# Patient Record
Sex: FEMALE | Race: WHITE | NOT HISPANIC OR LATINO | Employment: OTHER | URBAN - METROPOLITAN AREA
[De-identification: names, ages, dates, MRNs, and addresses within clinical notes are randomized per-mention and may not be internally consistent; named-entity substitution may affect disease eponyms.]

---

## 2019-02-22 ENCOUNTER — NURSE TRIAGE (OUTPATIENT)
Dept: NURSING | Facility: CLINIC | Age: 64
End: 2019-02-22

## 2019-02-22 ENCOUNTER — HOSPITAL ENCOUNTER (EMERGENCY)
Facility: CLINIC | Age: 64
Discharge: HOME | End: 2019-02-23
Attending: PHYSICIAN ASSISTANT | Admitting: PHYSICIAN ASSISTANT
Payer: COMMERCIAL

## 2019-02-22 VITALS
OXYGEN SATURATION: 99 % | DIASTOLIC BLOOD PRESSURE: 109 MMHG | SYSTOLIC BLOOD PRESSURE: 174 MMHG | TEMPERATURE: 97.2 F | HEART RATE: 77 BPM | RESPIRATION RATE: 18 BRPM | WEIGHT: 200 LBS

## 2019-02-22 DIAGNOSIS — B02.9 HERPES ZOSTER WITHOUT COMPLICATION: ICD-10-CM

## 2019-02-22 PROCEDURE — 99282 EMERGENCY DEPT VISIT SF MDM: CPT

## 2019-02-22 SDOH — HEALTH STABILITY: MENTAL HEALTH: HOW OFTEN DO YOU HAVE A DRINK CONTAINING ALCOHOL?: NEVER

## 2019-02-22 NOTE — ED AVS SNAPSHOT
[facility] Emergency Department  [address]  [facility] [address]  Phone:  [phone]  Fax:  [phone]                                    Clare Wallace   MRN: [id number]    Department:  [facility] Emergency Department   Date of Visit:  2/22/2019           After Visit Summary Signature Page    I have received my discharge instructions, and my questions have been answered. I have discussed any challenges I see with this plan with the nurse or doctor.    ..........................................................................................................................................  Patient/Patient Representative Signature      ..........................................................................................................................................  Patient Representative Print Name and Relationship to Patient    ..................................................               ................................................  Date                                   Time    ..........................................................................................................................................  Reviewed by Signature/Title    ...................................................              ..............................................  Date                                               Time          22EPIC Rev 08/18

## 2019-02-23 RX ORDER — VALACYCLOVIR HYDROCHLORIDE 1 G/1
1000 TABLET, FILM COATED ORAL 3 TIMES DAILY
Qty: 21 TABLET | Refills: 0 | Status: SHIPPED | OUTPATIENT
Start: 2019-02-23 | End: 2020-08-12 | Stop reason: DRUGHIGH

## 2019-02-23 ASSESSMENT — ENCOUNTER SYMPTOMS
FEVER: 0
NUMBNESS: 1

## 2019-02-23 NOTE — ED PROVIDER NOTES
Emergency Department Attending Supervision Note  2/23/2019  1:06 AM      I evaluated this patient in conjunction with Natalie Winslow PA-C.      Briefly, the patient presented with a facial rash and numbness. The patient reported recently having a tooth extracted from the right side of her mouth 5 days ago and now has been developing a right sided facial rash and numbness since.     Exam:  General: Patient is alert and interactive when I enter the room  Head:  The scalp, face, and head appear normal  Eyes:  Conjunctivae are normal  ENT:    The nose is normal    Pinnae are normal    External acoustic canals are normal, TMs clear with no vesicular lesions  Neck:  Trachea midline  CV:  Pulses are normal  Resp:  No respiratory distress   Abdomen:      Soft, non-tender, non-distended  Musc:  Normal muscular tone    No major joint effusions    No asymmetric leg swelling    Good capillary refill noted  Skin:  Diffuse erythematous rash on right lateral neck that stops just short of midline in the posterior and aspect, scattered vesicles, no rash on face  Neuro:  Speech is normal and fluent. Face is symmetric.     Moving all extremities well.  Cranial nerves intact.  Strength intact in lower extremities.  Decreased sensation on the right half of face.  Psych: Awake. Alert.  Normal affect.  Appropriate interactions.    MDM:  Clare Wallace is a 63-year-old female who presents with rash and facial numbness.  The rash seems consistent with shingles however it does not completely explain the right-sided facial numbness.  It is on the same side of her rash.  At this point I think she appears quite well and no signs of Ck Hunt syndrome.  I think treatment for her shingles would be the first place to start and then see if her symptoms of numbness improve with this.  Patient was comfortable this plan patient discharged.    Diagnosis    ICD-10-CM    1. Herpes zoster without complication B02.9          Merry CALLOWAY, am  serving as a scribe at 1:06 AM on 2/23/2019 to document services personally performed by Amy Esteban MD based on my observations and the provider's statements to me.        Amy Esteban MD  02/26/19 0532

## 2019-02-23 NOTE — ED PROVIDER NOTES
History     Chief Complaint:  Rash and Numbness    HPI   Clare Wallace is a 63 year old female who presents to the emergency department today for evaluation of a neck dennis and numbness. The patient recently had a tooth extracted on the right. Now, for the past 5 days, she has been developing a rash to her right neck as well. Tonight, the entire right side of her face went numb, prompting her visit. Denies facial droop or weakness. She is not on antibiotics, however has been using topical hydrocortisone on her rash which has not helped at all. She is not in pain other than at the site of her tooth extraction.     Allergies:  No Known Drug Allergies    Medications:    Medications reviewed. No pertinent medications.    Past Medical History:    History reviewed. No pertinent past medical history.    Past Surgical History:    Tooth extraction    Family History:    History reviewed. No pertinent family history.    Social History:  The patient was accompanied to the ED by her .  Smoking Status: Never Smoker  Smokeless Tobacco: Never Used  Alcohol Use: Negative    Marital Status:      Review of Systems   Constitutional: Negative for fever.   HENT: Positive for dental problem.    Skin: Positive for rash.   Neurological: Positive for numbness.   All other systems reviewed and are negative.    Physical Exam     Patient Vitals for the past 24 hrs:   BP Temp Temp src Pulse Resp SpO2 Weight   02/22/19 2354 (!) 174/109 97.2  F (36.2  C) Temporal 77 18 99 % 90.7 kg (200 lb)      Physical Exam  Constitutional: non-toxic appearing, no acute distress.   Head: No external signs of trauma noted to head or face.   Eyes: Pupils are equal, round, and reactive to light. Conjunctiva normal. EOMI.   ENT: MMM. Oropharynx clear and moist. No intraoral swelling or abscess noted. Normal voice.   Neck: erythematous patch-like rash on right side of neck with some small vesicular appearing lesions noted at hairline. Rash does not  cross the midline anteriorly or posteriorly. No lymphadenopathy. Normal ROM. Non-tender.   Cardiovascular: Normal rate, regular rhythm, and intact distal pulses.    Respiratory: Effort normal. No respiratory distress. Lungs clear to auscultation bilaterally.   GI: Soft. There is no tenderness. There is no rebound.   Musculoskeletal: No deformities appreciated. Normal ROM. No edema noted.  Neurological: Alert and Oriented x 3. Speech normal. Moves all extremities equally. CN II-XII intact aside from slight diminished sensation in right face. Coordination normal. 5/5 strength of bilateral upper and lower extremities. Gait normal.   Psychiatric: Appropriate mood, affect, and behavior.   Skin: Skin is warm and dry. Neck rash as noted above.         Emergency Department Course     Emergency Department Course:    0000 Nursing notes and vitals reviewed.    0005 I performed an exam of the patient as documented above.     0111 I personally reviewed the results with the patient and answered all related questions prior to discharge.    Impression & Plan      Medical Decision Making:  Clare Wallace is a 63 year old female who presents to the emergency department today for evaluation of neck rash and facial numbness. She has no focal neurologic deficits on exam. While she reports numbness to her right face, she does have some sensation, just reports it is decreased compared to the left. There is no facial drooping. The rash on her neck is somewhat atypical for herpes zoster, though it does not cross the midline at all so it likely is zoster and she does have a few vesicular lesions noted on the back of her neck near her hairline. The rash is not consistent with cellulitis or a dermatitis at this time. Will plan to treat for herpes zoster with valacyclovir. Given no focal neurologic deficits on exam, there is no indication for any imaging at this time. Patient will be discharged home and will follow-up with her PCP in 1 week.  Instructed to return to the ED for any new or worsening symptoms, including spreading rash, facial droop, other neurologic signs/symptoms, etc.     Diagnosis:    ICD-10-CM    1. Herpes zoster without complication B02.9      Disposition:   Discharge    Discharge Medications:  New Prescriptions    VALACYCLOVIR (VALTREX) 1000 MG TABLET    Take 1 tablet (1,000 mg) by mouth 3 times daily for 7 days     Scribe Disclosure:  Luigi CALLOWAY, am serving as a scribe at 12:05 AM on 2/23/2019 to document services personally performed by Natalie Winslow PA-C based on my observations and the provider's statements to me.    [facility] EMERGENCY DEPARTMENT       Natalie Winslow PA-C  02/23/19 1868

## 2019-02-23 NOTE — ED NOTES
Pt provided with discharge paperwork and educated on recommended follow-up with PCP. Pt educated on how to manage symptoms at home and how to take prescription medications at home. Pt voiced understanding and denied any questions at discharge.

## 2019-02-23 NOTE — TELEPHONE ENCOUNTER
Clare calling with concerns that she had a tooth extraction on 2/15/19. She states that she has a red like rash that is hot to the touch on the right side of her face, the same side as her tooth extraction. And now since that time she has spreading numbness on that same side. She states that she can not get a hold of her Dentist.     Disposition: Advised to be seen in the ED. Have  drive. Clare verbalized understanding and states that she will come in to [facility].   RN advised to call back with any changes, worsening of symptoms, and questions or concerns.   Mayra Mcdaniels RN/FNA      Reason for Disposition    Patient sounds very sick or weak to the triager    Additional Information    Negative: Sounds like a life-threatening emergency to the triager    Negative: Tooth knocked out    Negative: [1] Bleeding present > 30 minutes AND [2] using correct technique of direct pressure    Negative: [1] Bleeding now AND [2] second call after being instructed in correct technique of direct pressure    Negative: [1] Has packing sutured over socket (extraction site) AND [2] now bleeding around the packing (Exception: few drops or ooze)    Protocols used: TOOTH EXTRACTION-ADULT-

## 2019-02-23 NOTE — ED TRIAGE NOTES
63-year-old female presents to the ER with complaints of rash to the right side of her neck that started about 5 days ago which has gotten bigger. States suddenly tonight about 2300 she started with right sided facial numbness as well.

## 2019-09-23 ENCOUNTER — TRANSFERRED RECORDS (OUTPATIENT)
Dept: HEALTH INFORMATION MANAGEMENT | Facility: CLINIC | Age: 64
End: 2019-09-23

## 2019-09-23 LAB — PAP-ABSTRACT: NORMAL

## 2019-10-19 ENCOUNTER — APPOINTMENT (OUTPATIENT)
Dept: CT IMAGING | Facility: CLINIC | Age: 64
End: 2019-10-19
Attending: EMERGENCY MEDICINE
Payer: COMMERCIAL

## 2019-10-19 ENCOUNTER — HOSPITAL ENCOUNTER (EMERGENCY)
Facility: CLINIC | Age: 64
Discharge: HOME | End: 2019-10-19
Attending: EMERGENCY MEDICINE | Admitting: EMERGENCY MEDICINE
Payer: COMMERCIAL

## 2019-10-19 VITALS
DIASTOLIC BLOOD PRESSURE: 84 MMHG | RESPIRATION RATE: 20 BRPM | BODY MASS INDEX: 32.42 KG/M2 | TEMPERATURE: 97.4 F | SYSTOLIC BLOOD PRESSURE: 144 MMHG | OXYGEN SATURATION: 99 % | WEIGHT: 206.57 LBS | HEIGHT: 67 IN

## 2019-10-19 DIAGNOSIS — S09.90XA HEAD INJURY, INITIAL ENCOUNTER: ICD-10-CM

## 2019-10-19 DIAGNOSIS — S00.03XA HEMATOMA OF SCALP, INITIAL ENCOUNTER: ICD-10-CM

## 2019-10-19 PROCEDURE — 99284 EMERGENCY DEPT VISIT MOD MDM: CPT | Mod: 25

## 2019-10-19 PROCEDURE — 70450 CT HEAD/BRAIN W/O DYE: CPT

## 2019-10-19 ASSESSMENT — ENCOUNTER SYMPTOMS
HEADACHES: 1
NAUSEA: 0

## 2019-10-19 ASSESSMENT — MIFFLIN-ST. JEOR: SCORE: 1534.63

## 2019-10-19 NOTE — ED AVS SNAPSHOT
[facility] Emergency Department  [address]  [facility] [address]  Phone:  [phone]  Fax:  [phone]                                    Clare Wallace   MRN: [id number]    Department:  [facility] Emergency Department   Date of Visit:  10/19/2019           After Visit Summary Signature Page    I have received my discharge instructions, and my questions have been answered. I have discussed any challenges I see with this plan with the nurse or doctor.    ..........................................................................................................................................  Patient/Patient Representative Signature      ..........................................................................................................................................  Patient Representative Print Name and Relationship to Patient    ..................................................               ................................................  Date                                   Time    ..........................................................................................................................................  Reviewed by Signature/Title    ...................................................              ..............................................  Date                                               Time          22EPIC Rev 08/18

## 2019-10-20 NOTE — ED PROVIDER NOTES
"  History     Chief Complaint:  Fall    HPI   Clare Wallace is a 63 year old female who presents with head injury.  Patient fell backwards and hit the left side of her scalp on a wood door.  She did not have LOC.  She has a very mild headache.  She denies any nausea or vomiting.  She is not on blood thinners.  She denies any confusion.    Allergies:  No known drug allergies    Medications:    The patient is not currently taking any prescribed medications.    Past Medical History:    The patient does not have any past pertinent medical history.    Past Surgical History:    History reviewed. No pertinent surgical history.    Family History:    History reviewed. No pertinent family history.     Social History:  Smoking status: Never smoker    Alcohol use: No  Marital Status:   [2]     Review of Systems   Gastrointestinal: Negative for nausea.   Neurological: Positive for headaches.   All other systems reviewed and are negative.    Physical Exam     Patient Vitals for the past 24 hrs:   BP Temp Temp src Heart Rate Resp SpO2 Height Weight   10/19/19 1916 (!) 144/84 97.4  F (36.3  C) Oral 68 20 99 % 1.702 m (5' 7\") 93.7 kg (206 lb 9.1 oz)     Physical Exam  General: Patient is alert and interactive when I enter the room  Head:  The scalp, face, and head appear normal  Eyes:  Conjunctivae are normal  ENT:    The nose is normal    Pinnae are normal    External acoustic canals are normal  Neck:  Trachea midline  CV:  Pulses are normal    Resp:  No respiratory distress   Abdomen:      Soft, non-tender, non-distended  Musc:  Normal muscular tone    No major joint effusions    No asymmetric leg swelling  Skin:  No rash or lesions noted  Neuro:  Speech is normal and fluent. Face is symmetric.     Moving all extremities well.   Psych: Awake. Alert.  Normal affect.  Appropriate interactions.    Emergency Department Course     Imaging:  Radiographic findings were communicated with the patient who voiced understanding of the " "findings.    CT Head w/o Contrast   Final Result   IMPRESSION:   1.  Small left parietal scalp hematoma. No fracture or acute hemorrhage.        Emergency Department Course:  Past medical records, nursing notes, and vitals reviewed.  I performed an exam of the patient and obtained history, as documented above.    The patient was sent for a head CT while in the emergency department, findings above.    Findings and plan explained to the Patient. Patient discharged home with instructions regarding supportive care, medications, and reasons to return. The importance of close follow-up was reviewed.     Impression & Plan      Medical Decision Making:  Clare Wallace is a 63 year old female who presents with a head injury. Based on this patient's initial presentation, I was concerned about possible intracranial injuries (e.g. skull fracture, epidural hematoma, subdural hematoma, intracerebral hemorrhage, and traumatic subarachnoid hemorrhage).  CT imaging was obtained and fortunately was normal.  The patient understand that she must return if any \"red flags\" appear/develop in the coming hours/days, as this may represent an indication to perform a repeat CT scan or further evaluation.  I have noted that \"red flags\" include: headaches that get worse, increased drowsiness, strange behavior, repetitive speech, seizures, repeated vomiting, growing confusion, increased irritability, slurred speech, weakness or numbness, and loss of responsiveness.  This information will also be provided in writing at discharge.  The patient's questions have been answered.     Diagnosis:    ICD-10-CM   1. Hematoma of scalp, initial encounter S00.03XA   2. Head injury, initial encounter S09.90XA     Disposition:  discharged to home    10/19/2019   [facility] EMERGENCY DEPARTMENT       Amy Esteban MD  10/19/19 2234    "

## 2019-10-20 NOTE — ED TRIAGE NOTES
Pt slipped on waxed wood floor 1 hour PTA and hit posterior L side of head on corner wood door frame. Denies blood thinners, nausea, or LOC.  ABC in tact. A/oX4

## 2020-03-11 ENCOUNTER — HEALTH MAINTENANCE LETTER (OUTPATIENT)
Age: 65
End: 2020-03-11

## 2020-08-12 ENCOUNTER — OFFICE VISIT (OUTPATIENT)
Dept: PODIATRY | Facility: CLINIC | Age: 65
End: 2020-08-12
Payer: COMMERCIAL

## 2020-08-12 VITALS
HEIGHT: 67 IN | SYSTOLIC BLOOD PRESSURE: 144 MMHG | BODY MASS INDEX: 34.53 KG/M2 | WEIGHT: 220 LBS | HEART RATE: 70 BPM | DIASTOLIC BLOOD PRESSURE: 68 MMHG

## 2020-08-12 DIAGNOSIS — B35.1 ONYCHOMYCOSIS: ICD-10-CM

## 2020-08-12 DIAGNOSIS — M72.2 PLANTAR FASCIITIS: Primary | ICD-10-CM

## 2020-08-12 DIAGNOSIS — B35.9 TINEA: ICD-10-CM

## 2020-08-12 PROBLEM — H52.4 PRESBYOPIA: Status: ACTIVE | Noted: 2018-12-18

## 2020-08-12 PROBLEM — H52.222 REGULAR ASTIGMATISM OF LEFT EYE: Status: ACTIVE | Noted: 2018-12-18

## 2020-08-12 PROBLEM — D31.31 CHOROIDAL NEVUS OF RIGHT EYE: Status: ACTIVE | Noted: 2018-12-18

## 2020-08-12 PROBLEM — H52.11 MYOPIA OF RIGHT EYE: Status: ACTIVE | Noted: 2018-12-18

## 2020-08-12 PROCEDURE — 99203 OFFICE O/P NEW LOW 30 MIN: CPT | Performed by: PODIATRIST

## 2020-08-12 RX ORDER — TERBINAFINE HYDROCHLORIDE 250 MG/1
250 TABLET ORAL DAILY
Qty: 30 TABLET | Refills: 0 | Status: SHIPPED | OUTPATIENT
Start: 2020-08-12 | End: 2020-10-11

## 2020-08-12 RX ORDER — LEVOTHYROXINE SODIUM 125 UG/1
125 TABLET ORAL
COMMUNITY
Start: 2020-05-26 | End: 2021-03-30

## 2020-08-12 RX ORDER — HYDROCHLOROTHIAZIDE 12.5 MG/1
12.5 TABLET ORAL
COMMUNITY
Start: 2020-07-08 | End: 2021-03-30

## 2020-08-12 RX ORDER — ERGOCALCIFEROL 1.25 MG/1
50000 CAPSULE, LIQUID FILLED ORAL
COMMUNITY
Start: 2020-06-08 | End: 2021-03-30

## 2020-08-12 RX ORDER — VALACYCLOVIR HYDROCHLORIDE 1 G/1
1 TABLET, FILM COATED ORAL
COMMUNITY
Start: 2020-02-18 | End: 2021-11-22

## 2020-08-12 ASSESSMENT — MIFFLIN-ST. JEOR: SCORE: 1590.54

## 2020-08-12 NOTE — PROGRESS NOTES
"Subjective:    Patient is seen today as a new pt self referral with a 2 month(s) hx of right heel pain.  Points to the plantarmedial calcaneal tubercle.  Most painful upon rising in a.m. or after prolonged sitting.  Aggravated by activity and relieved by rest.  Has tried changing shoewear, OTC inserts, without much success.  Denies erythema, edema, ecchymosis, numbness, loss of strength.  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  Mostly sitting during the day.  She also has dry skin on her right foot.  She said this for many years.  She is tried numerous topical antifungals.  She also has a thick right hallux nail.  This is not painful at all.    ROS:  A 10-point review of systems was performed and is positive for that noted in the HPI and as seen below.  All other areas are negative.      No Known Allergies    Current Outpatient Medications   Medication Sig Dispense Refill     hydrochlorothiazide (HYDRODIURIL) 12.5 MG tablet 12.5 mg       levothyroxine (SYNTHROID/LEVOTHROID) 125 MCG tablet 125 mcg       terbinafine (LAMISIL) 250 MG tablet Take 1 tablet (250 mg) by mouth daily 30 tablet 0     valACYclovir (VALTREX) 1000 mg tablet Take 1 g by mouth       vitamin D2 (ERGOCALCIFEROL) 04045 units (1250 mcg) capsule Take 50,000 Units by mouth         Patient Active Problem List   Diagnosis     Choroidal nevus of right eye     Genital herpes     Headache     Heart murmur     Hypothyroidism     Menopausal state     Migraine     Myopia of right eye     Presbyopia     Regular astigmatism of left eye     Tinnitus       No past medical history on file.    No past surgical history on file.    No family history on file.    Social History     Tobacco Use     Smoking status: Never Smoker     Smokeless tobacco: Never Used   Substance Use Topics     Alcohol use: No     Frequency: Never         Objective:    Vitals: BP (!) 144/68   Pulse 70   Ht 1.702 m (5' 7\")   Wt 99.8 kg (220 lb)   BMI 34.46 kg/m    BMI: Body mass index is 34.46 " "kg/m .  Height: 5' 7\"    Constitutional/ general:  Pt is in no apparent distress, appears well-nourished.  Cooperative with history and physical exam.     Psych:  The patient answered questions appropriately.  Normal affect.  Seems to have reasonable expectations, in terms of treatment.     Eyes:  Visual scanning/ tracking without deficit.     Ears:  Response to auditory stimuli is normal.  No hearing aid devices.  Auricles in proper alignment.     Lymphatic:  Popliteal lymph nodes not enlarged.     Lungs:  Non labored breathing, non labored speech. No cough.  No audible wheezing. Even, quiet breathing.       Vascular:  Pedal pulses are palpable bilaterally for both the DP and PT arteries.  CFT < 3 sec.  No edema.  Pedal hair growth noted.     Neuro:  Alert and oriented x 3. Coordinated gait.  Light touch sensation is intact to the L4, L5, S1 distributions. No obvious deficits.  No evidence of neurological-based weakness, spasticity, or contracture in the lower extremities.     Derm: Normal texture and turgor.  No erythema, ecchymosis, or cyanosis.  No open lesions.  Right foot skin dry moccasin distribution.  Right hallux nail mycotic.    Musculoskeletal:    Lower extremity muscle strength is normal.  Patient is ambulatory without an assistive device or brace.  No gross deformities.      Normal arch with weightbearing.   ROM is within normal limits.  Negative tinel's sign.  No side to side compression pain of the calcaneus.  Pain upon palpation to the right plantarmedial  of the calcaneus.  No erythema, edema, ecchymosis, or subcutaneous masses noted.  No pain on palpation or stressing any tendons.  Negative Tinel's sign          Assessment:    Plantar Fasciitis right foot   right chronic tinea  Right hallux onychomycosis      Plan:   Discussed cause of fungal infection in both skin and nail.  She would rather just watch the nail.  She is more concerned about her dry skin.  She has failed topical antifungals.  We " discussed going on and Lamisil orally for 1 month.  She is in agreement with this.  Wrote a prescription.  We discussed strategies on keeping her dry foot in the future.      Discussed etiology and treatment options with the patient.  The potential causes and nature of plantar fasciitis were discussed with the patient.  We reviewed the natural history/prognosis of the condition and risks if left untreated.  These include chronic pain, other sites of pain due to gait changes, and potential plantar fascial rupture.      We discussed possible causes of the condition as it relates to the patients specific situation.      Conservative treatment options were reviewed:  appropriate shoes, avoidance of barefoot walking, inserts/orthoses, stretching, ice, massage, immobilization and NSAIDs.     We also reviewed the options of injection therapy and surgery.  However, it was made clear that surgery is only considered when conservative therapy fails.  The risks and benefits of injection therapy, and surgery were discussed.  Dispensed handout.       After thorough discussion and answering all questions, the patient elected to modifying activities, supportive shoes, ice, stretching, and not going barefoot.  Good house shoes at all times and I made recommendations.  Avoid activities of bother this and we discussed what will bother this.  RTC in 4 weeks if not better otherwise prn.     Heriberto Dickerson, MARIYA DPTONYA, FACFAS

## 2020-08-12 NOTE — PATIENT INSTRUCTIONS
We wish you continued good healing. If you have any questions or concerns, please do not hesitate to contact us at [phone]    Please remember to call and schedule a follow up appointment if one was recommended at your earliest convenience.   PODIATRY CLINIC HOURS  TELEPHONE NUMBER    Dr. Heriberto Dickerson D.P.M [facility]    Clinics:  [facility]    [facility]ushka [facility]   Tuesday 1PM-6PM  [place]  Wednesday 7AM-2PM  [facility]  Thursday 10AM-6PM  [place]  Friday 7AM-3PM  [place]  Specialty schedulers:   [phone] to make an appointment with any Specialty Provider.        Urgent Care locations:    [facility] Monday-Friday 5 pm - 9 pm. Saturday-Sunday 9 am -5pm    Monday-Friday 11 am - 9 pm Saturday 9 am - 5 pm     Monday-Sunday 12 noon-8PM [phone]     If you need a medication refill, please contact us you may need lab work and/or a follow up visit prior to your refill (i.e. Antifungal medications).    [employer] (secure e-mail communication and access to your chart) to send a message or to make an appointment.    If MRI needed please call [facility] Uribe at [phone]

## 2020-08-12 NOTE — LETTER
8/12/2020         RE: Clare Wallace  [address]  [facility] [address]        Dear Colleague,    Thank you for referring your patient, Clare Wallace, to the [facility]. Please see a copy of my visit note below.    Subjective:    Patient is seen today as a new pt self referral with a 2 month(s) hx of right heel pain.  Points to the plantarmedial calcaneal tubercle.  Most painful upon rising in a.m. or after prolonged sitting.  Aggravated by activity and relieved by rest.  Has tried changing shoewear, OTC inserts, without much success.  Denies erythema, edema, ecchymosis, numbness, loss of strength.  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  Mostly sitting during the day.  She also has dry skin on her right foot.  She said this for many years.  She is tried numerous topical antifungals.  She also has a thick right hallux nail.  This is not painful at all.    ROS:  A 10-point review of systems was performed and is positive for that noted in the HPI and as seen below.  All other areas are negative.      No Known Allergies    Current Outpatient Medications   Medication Sig Dispense Refill     hydrochlorothiazide (HYDRODIURIL) 12.5 MG tablet 12.5 mg       levothyroxine (SYNTHROID/LEVOTHROID) 125 MCG tablet 125 mcg       terbinafine (LAMISIL) 250 MG tablet Take 1 tablet (250 mg) by mouth daily 30 tablet 0     valACYclovir (VALTREX) 1000 mg tablet Take 1 g by mouth       vitamin D2 (ERGOCALCIFEROL) 94195 units (1250 mcg) capsule Take 50,000 Units by mouth         Patient Active Problem List   Diagnosis     Choroidal nevus of right eye     Genital herpes     Headache     Heart murmur     Hypothyroidism     Menopausal state     Migraine     Myopia of right eye     Presbyopia     Regular astigmatism of left eye     Tinnitus       No past medical history on file.    No past surgical history on file.    No family history on file.    Social History     Tobacco Use     Smoking status: Never Smoker     Smokeless  "tobacco: Never Used   Substance Use Topics     Alcohol use: No     Frequency: Never         Objective:    Vitals: BP (!) 144/68   Pulse 70   Ht 1.702 m (5' 7\")   Wt 99.8 kg (220 lb)   BMI 34.46 kg/m    BMI: Body mass index is 34.46 kg/m .  Height: 5' 7\"    Constitutional/ general:  Pt is in no apparent distress, appears well-nourished.  Cooperative with history and physical exam.     Psych:  The patient answered questions appropriately.  Normal affect.  Seems to have reasonable expectations, in terms of treatment.     Eyes:  Visual scanning/ tracking without deficit.     Ears:  Response to auditory stimuli is normal.  No hearing aid devices.  Auricles in proper alignment.     Lymphatic:  Popliteal lymph nodes not enlarged.     Lungs:  Non labored breathing, non labored speech. No cough.  No audible wheezing. Even, quiet breathing.       Vascular:  Pedal pulses are palpable bilaterally for both the DP and PT arteries.  CFT < 3 sec.  No edema.  Pedal hair growth noted.     Neuro:  Alert and oriented x 3. Coordinated gait.  Light touch sensation is intact to the L4, L5, S1 distributions. No obvious deficits.  No evidence of neurological-based weakness, spasticity, or contracture in the lower extremities.     Derm: Normal texture and turgor.  No erythema, ecchymosis, or cyanosis.  No open lesions.  Right foot skin dry moccasin distribution.  Right hallux nail mycotic.    Musculoskeletal:    Lower extremity muscle strength is normal.  Patient is ambulatory without an assistive device or brace.  No gross deformities.      Normal arch with weightbearing.   ROM is within normal limits.  Negative tinel's sign.  No side to side compression pain of the calcaneus.  Pain upon palpation to the right plantarmedial  of the calcaneus.  No erythema, edema, ecchymosis, or subcutaneous masses noted.  No pain on palpation or stressing any tendons.  Negative Tinel's sign          Assessment:    Plantar Fasciitis right foot   right " chronic tinea  Right hallux onychomycosis      Plan:   Discussed cause of fungal infection in both skin and nail.  She would rather just watch the nail.  She is more concerned about her dry skin.  She has failed topical antifungals.  We discussed going on and Lamisil orally for 1 month.  She is in agreement with this.  Wrote a prescription.  We discussed strategies on keeping her dry foot in the future.      Discussed etiology and treatment options with the patient.  The potential causes and nature of plantar fasciitis were discussed with the patient.  We reviewed the natural history/prognosis of the condition and risks if left untreated.  These include chronic pain, other sites of pain due to gait changes, and potential plantar fascial rupture.      We discussed possible causes of the condition as it relates to the patients specific situation.      Conservative treatment options were reviewed:  appropriate shoes, avoidance of barefoot walking, inserts/orthoses, stretching, ice, massage, immobilization and NSAIDs.     We also reviewed the options of injection therapy and surgery.  However, it was made clear that surgery is only considered when conservative therapy fails.  The risks and benefits of injection therapy, and surgery were discussed.  Dispensed handout.       After thorough discussion and answering all questions, the patient elected to modifying activities, supportive shoes, ice, stretching, and not going barefoot.  Good house shoes at all times and I made recommendations.  Avoid activities of bother this and we discussed what will bother this.  RTC in 4 weeks if not better otherwise prn.     Heriberto Dickerson DPM DPM, FACFAS      Again, thank you for allowing me to participate in the care of your patient.        Sincerely,        Heriberto Dickerson DPM

## 2020-08-19 ENCOUNTER — NURSE TRIAGE (OUTPATIENT)
Dept: NURSING | Facility: CLINIC | Age: 65
End: 2020-08-19

## 2020-08-19 NOTE — TELEPHONE ENCOUNTER
Patient calling requesting refill for valACYclovir (VALTREX) 1000 mg tablet. The medication is not an active medication for patient. It is reported as a historical medication. Patient cannot remember the provider that prescribed the medication. Informed patient, RN unable to process the refill request due to the medication being not an active medication. Advised to call clinic in the morning. Patient states she will call [facility] clinic now since they prescribed the original prescription for her.     Cooper Husain RN  [facility]

## 2020-10-11 ENCOUNTER — MYC REFILL (OUTPATIENT)
Dept: OTHER | Age: 65
End: 2020-10-11

## 2020-10-11 DIAGNOSIS — B35.9 TINEA: ICD-10-CM

## 2020-10-13 RX ORDER — TERBINAFINE HYDROCHLORIDE 250 MG/1
250 TABLET ORAL DAILY
Qty: 30 TABLET | Refills: 0 | Status: SHIPPED | OUTPATIENT
Start: 2020-10-13 | End: 2021-03-30

## 2020-10-29 ENCOUNTER — NURSE TRIAGE (OUTPATIENT)
Dept: NURSING | Facility: CLINIC | Age: 65
End: 2020-10-29

## 2020-10-30 NOTE — TELEPHONE ENCOUNTER
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  She is not displaying any symptoms, but would like to be tested as soon as possible.      She has explored alternative testing options on [facility] and [facility] websites, but the soonest she can get tested, she says, is next Wednesday.  Gave patient information on [email or web address].   Patient has had a PCP with [facility], and will send a message to them in the morning.  Reminded caller that [facility] testing sites need an order from a [facility] provider, so she would need an [facility] testing site.  Patient expressed understanding.    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 If you develop symptoms such as fever, cough, or SOB or if you have concerns about the presence of another infection including coronavirus (COVID-19), please contact your health care provider or visit [email or web address].       Home care recommended. Follow home care protocol based instructions.    Thank you for taking steps to prevent the spread of this virus.  Limit your contact with others.  Wear a simple mask to cover your cough.  Wash your hands well and often.    Resources  [facility]: About COVID-19: [email or web address]  CDC: What to Do If You're Sick: www.cdc.gov/coronavirus/2019-ncov/about/steps-when-sick.html  CDC: Ending Home Isolation: www.cdc.gov/coronavirus/2019-ncov/hcp/disposition-in-home-patients.html   CDC:  Caring for Someone: www.cdc.gov/coronavirus/2019-ncov/if-you-are-sick/care-for-someone.html   [facility]: Interim Guidance for Hospital Discharge to Home: www.health.[facility].[place]/diseases/coronavirus/hcp/hospdischarge.pdf  [facility] clinical trials (COVID-19 research studies): clinicalaffairs.[facility]-clinical-trials   Below are the COVID-19 hotlines at the [place] Department of Health ([facility]). Interpreters are available.   For health questions: Call 913-445-2210 or 1-889.361.4214 (7 a.m. to 7 p.m.)  For questions about schools and childcare: Call 730-315-8425 or 1-136.539.2356 (7 a.m. to 7 p.m.)    Protocols used: CORONAVIRUS (COVID-19) EXPOSURE-A- 8.4.20

## 2020-11-06 DIAGNOSIS — L02.91 ABSCESS: Primary | ICD-10-CM

## 2020-11-06 PROCEDURE — 87076 CULTURE ANAEROBE IDENT EACH: CPT

## 2020-11-06 PROCEDURE — 87070 CULTURE OTHR SPECIMN AEROBIC: CPT

## 2020-11-06 PROCEDURE — 87077 CULTURE AEROBIC IDENTIFY: CPT

## 2020-11-06 PROCEDURE — 87186 SC STD MICRODIL/AGAR DIL: CPT

## 2020-11-06 PROCEDURE — 87075 CULTR BACTERIA EXCEPT BLOOD: CPT

## 2020-11-10 LAB
BACTERIA SPEC CULT: ABNORMAL
BACTERIA SPEC CULT: ABNORMAL
Lab: ABNORMAL
SPECIMEN SOURCE: ABNORMAL

## 2020-11-13 LAB
BACTERIA SPEC CULT: ABNORMAL
Lab: ABNORMAL
SPECIMEN SOURCE: ABNORMAL

## 2021-01-03 ENCOUNTER — HEALTH MAINTENANCE LETTER (OUTPATIENT)
Age: 66
End: 2021-01-03

## 2021-01-07 ENCOUNTER — TELEPHONE (OUTPATIENT)
Dept: FAMILY MEDICINE | Facility: CLINIC | Age: 66
End: 2021-01-07

## 2021-01-07 NOTE — TELEPHONE ENCOUNTER
Reason for call:  Medication   If this is a refill request, has the caller requested the refill from the pharmacy already? No  Will the patient be using a [facility]? No  Name of the pharmacy and phone number for the current request: [id number]    [phone]   [phone]    Name of the medication requested: levothyroxine 125 mcg    Other request: Patient has appointment on 1/13/21 to be seen for annual check for medication, she is asking if she could get a refill on levothyroxine 125 mcg,  Patient is out of the medication -- please call pt when refill has been sent      Phone number to reach patient:  Cell number on file:    Telephone Information:   Mobile [phone]       Best Time:  anytime    Can we leave a detailed message on this number?  YES    Travel screening: Not Applicable

## 2021-01-08 RX ORDER — LEVOTHYROXINE SODIUM 125 UG/1
125 TABLET ORAL
Status: CANCELLED | OUTPATIENT
Start: 2021-01-08

## 2021-01-08 NOTE — TELEPHONE ENCOUNTER
"See note, pt new for [facility], called pt, informed cannot refill until establishing care with new pcp here, advised to call current PCP to refill thyroid and reschedule appointment to establish care with new PCP, needs longer appointment, pt agrees, \"running out the door\", will call back to schedule appointment to establish care  Patrica Klein RN, BSN  Message handled by CLINIC NURSE.    "

## 2021-03-30 ENCOUNTER — VIRTUAL VISIT (OUTPATIENT)
Dept: FAMILY MEDICINE | Facility: CLINIC | Age: 66
End: 2021-03-30
Payer: COMMERCIAL

## 2021-03-30 DIAGNOSIS — E03.9 HYPOTHYROIDISM, UNSPECIFIED TYPE: Primary | ICD-10-CM

## 2021-03-30 DIAGNOSIS — I10 BENIGN ESSENTIAL HYPERTENSION: ICD-10-CM

## 2021-03-30 PROCEDURE — 99203 OFFICE O/P NEW LOW 30 MIN: CPT | Mod: 95 | Performed by: FAMILY MEDICINE

## 2021-03-30 RX ORDER — HYDROCHLOROTHIAZIDE 12.5 MG/1
12.5 TABLET ORAL DAILY
Qty: 90 TABLET | Refills: 0 | Status: SHIPPED | OUTPATIENT
Start: 2021-03-30 | End: 2021-05-06

## 2021-03-30 RX ORDER — LEVOTHYROXINE SODIUM 125 UG/1
125 TABLET ORAL DAILY
Qty: 90 TABLET | Refills: 0 | Status: SHIPPED | OUTPATIENT
Start: 2021-03-30 | End: 2021-05-06

## 2021-04-25 ENCOUNTER — HEALTH MAINTENANCE LETTER (OUTPATIENT)
Age: 66
End: 2021-04-25

## 2021-05-04 ENCOUNTER — OFFICE VISIT (OUTPATIENT)
Dept: FAMILY MEDICINE | Facility: CLINIC | Age: 66
End: 2021-05-04
Payer: COMMERCIAL

## 2021-05-04 ENCOUNTER — TELEPHONE (OUTPATIENT)
Dept: FAMILY MEDICINE | Facility: CLINIC | Age: 66
End: 2021-05-04

## 2021-05-04 VITALS
OXYGEN SATURATION: 94 % | SYSTOLIC BLOOD PRESSURE: 109 MMHG | TEMPERATURE: 97.6 F | BODY MASS INDEX: 34.67 KG/M2 | WEIGHT: 220.9 LBS | RESPIRATION RATE: 18 BRPM | DIASTOLIC BLOOD PRESSURE: 69 MMHG | HEIGHT: 67 IN | HEART RATE: 63 BPM

## 2021-05-04 DIAGNOSIS — Z00.00 ROUTINE GENERAL MEDICAL EXAMINATION AT A HEALTH CARE FACILITY: Primary | ICD-10-CM

## 2021-05-04 DIAGNOSIS — Z12.11 SCREEN FOR COLON CANCER: ICD-10-CM

## 2021-05-04 DIAGNOSIS — Z11.59 NEED FOR HEPATITIS C SCREENING TEST: ICD-10-CM

## 2021-05-04 DIAGNOSIS — Z12.31 ENCOUNTER FOR SCREENING MAMMOGRAM FOR BREAST CANCER: ICD-10-CM

## 2021-05-04 DIAGNOSIS — Z78.9 VEGETARIAN DIET: ICD-10-CM

## 2021-05-04 DIAGNOSIS — Z11.4 SCREENING FOR HIV (HUMAN IMMUNODEFICIENCY VIRUS): ICD-10-CM

## 2021-05-04 PROCEDURE — 99396 PREV VISIT EST AGE 40-64: CPT | Performed by: FAMILY MEDICINE

## 2021-05-04 ASSESSMENT — ENCOUNTER SYMPTOMS
WEAKNESS: 0
DIARRHEA: 0
PALPITATIONS: 0
PARESTHESIAS: 0
NERVOUS/ANXIOUS: 0
COUGH: 0
FEVER: 0
HEMATOCHEZIA: 0
MYALGIAS: 0
EYE PAIN: 0
ABDOMINAL PAIN: 0
JOINT SWELLING: 0
NAUSEA: 0
BREAST MASS: 0
DIZZINESS: 0
DYSURIA: 0
FREQUENCY: 0
SORE THROAT: 0
HEARTBURN: 0
SHORTNESS OF BREATH: 0
CHILLS: 0
HEMATURIA: 0
HEADACHES: 0
ARTHRALGIAS: 0
CONSTIPATION: 0

## 2021-05-04 ASSESSMENT — MIFFLIN-ST. JEOR: SCORE: 1581.69

## 2021-05-04 NOTE — PROGRESS NOTES
SUBJECTIVE:   CC: Clare Wallace is an 65 year old woman who presents for preventive health visit.       Patient has been advised of split billing requirements and indicates understanding: Yes  Healthy Habits:     Getting at least 3 servings of Calcium per day:  Yes    Bi-annual eye exam:  Yes    Dental care twice a year:  NO    Sleep apnea or symptoms of sleep apnea:  None    Diet:  Vegetarian/vegan    Frequency of exercise:  4-5 days/week    Duration of exercise:  30-45 minutes    Taking medications regularly:  Yes    Medication side effects:  None    PHQ-2 Total Score: 0    Additional concerns today:  No        Today's PHQ-2 Score:   PHQ-2 ( 1999 Pfizer) 5/4/2021   Q1: Little interest or pleasure in doing things 0   Q2: Feeling down, depressed or hopeless 0   PHQ-2 Score 0   Q1: Little interest or pleasure in doing things Not at all   Q2: Feeling down, depressed or hopeless Not at all   PHQ-2 Score 0       Abuse: Current or Past (Physical, Sexual or Emotional) - No  Do you feel safe in your environment? Yes    Have you ever done Advance Care Planning? (For example, a Health Directive, POLST, or a discussion with a medical provider or your loved ones about your wishes): No, advance care planning information given to patient to review.  Patient plans to discuss their wishes with loved ones or provider.      Social History     Tobacco Use     Smoking status: Never Smoker     Smokeless tobacco: Never Used   Substance Use Topics     Alcohol use: No     Frequency: Never     If you drink alcohol do you typically have >3 drinks per day or >7 drinks per week? No    Alcohol Use 5/4/2021   Prescreen: >3 drinks/day or >7 drinks/week? No   No flowsheet data found.    Reviewed orders with patient.  Reviewed health maintenance and updated orders accordingly - Yes  Labs reviewed in EPIC    Breast Cancer Screening:  Any new diagnosis of family breast, ovarian, or bowel cancer? No    FSH-7: No flowsheet data found.  click  "delete button to remove this line now  Mammogram Screening: Recommended mammography every 1-2 years with patient discussion and risk factor consideration  Pertinent mammograms are reviewed under the imaging tab.    History of abnormal Pap smear: NO - age 30-65 PAP every 5 years with negative HPV co-testing recommended     Reviewed and updated as needed this visit by clinical staff  Tobacco  Allergies    Med Hx  Surg Hx  Fam Hx  Soc Hx        Reviewed and updated as needed this visit by Provider                    Review of Systems   Constitutional: Negative for chills and fever.   HENT: Negative for congestion, ear pain, hearing loss and sore throat.    Eyes: Negative for pain and visual disturbance.   Respiratory: Negative for cough and shortness of breath.    Cardiovascular: Negative for chest pain, palpitations and peripheral edema.   Gastrointestinal: Negative for abdominal pain, constipation, diarrhea, heartburn, hematochezia and nausea.   Breasts:  Negative for tenderness, breast mass and discharge.   Genitourinary: Negative for dysuria, frequency, genital sores, hematuria, pelvic pain, urgency, vaginal bleeding and vaginal discharge.   Musculoskeletal: Negative for arthralgias, joint swelling and myalgias.   Skin: Negative for rash.   Neurological: Negative for dizziness, weakness, headaches and paresthesias.   Psychiatric/Behavioral: Negative for mood changes. The patient is not nervous/anxious.           OBJECTIVE:   /69 (BP Location: Right arm, Patient Position: Chair, Cuff Size: Adult Large)   Pulse 63   Temp 97.6  F (36.4  C) (Oral)   Resp 18   Ht 1.689 m (5' 6.5\")   Wt 100.2 kg (220 lb 14.4 oz)   LMP  (Exact Date)   SpO2 94%   BMI 35.12 kg/m    Physical Exam  GENERAL APPEARANCE: healthy, alert and no distress  EYES: Eyes grossly normal to inspection, PERRL and conjunctivae and sclerae normal  HENT: ear canals and TM's normal, nose and mouth without ulcers or lesions, oropharynx clear " and oral mucous membranes moist  NECK: no adenopathy, no asymmetry, masses, or scars and thyroid normal to palpation  RESP: lungs clear to auscultation - no rales, rhonchi or wheezes  BREAST: normal without masses, tenderness or nipple discharge and no palpable axillary masses or adenopathy  CV: regular rate and rhythm, normal S1 S2, no S3 or S4, no murmur, click or rub, no peripheral edema and peripheral pulses strong  ABDOMEN: soft, nontender, no hepatosplenomegaly, no masses and bowel sounds normal  MS: no musculoskeletal defects are noted and gait is age appropriate without ataxia  SKIN: no suspicious lesions or rashes  NEURO: Normal strength and tone, sensory exam grossly normal, mentation intact and speech normal  PSYCH: mentation appears normal and affect normal/bright    Diagnostic Test Results:  Labs reviewed in Epic  none     ASSESSMENT/PLAN:   1. Routine general medical examination at a health care facility  - Lipid panel reflex to direct LDL Fasting; Future  - **CBC with platelets FUTURE anytime; Future  - **Comprehensive metabolic panel FUTURE anytime; Future  - **TSH with free T4 reflex FUTURE anytime; Future  - **A1C FUTURE anytime; Future    2. Screen for colon cancer  - GASTROENTEROLOGY ADULT REF PROCEDURE ONLY; Future    3. Screening for HIV (human immunodeficiency virus)  - HIV Antigen Antibody Combo; Future    4. Need for hepatitis C screening test  - Hepatitis C Screen Reflex to HCV RNA Quant and Genotype; Future      5. Encounter for screening mammogram for breast cancer  - MA Screen Bilateral w/Shaheed; Future    6. Vegetarian diet  - **Vitamin B12 FUTURE 2mo; Future    Patient has been advised of split billing requirements and indicates understanding: Yes  COUNSELING:  Reviewed preventive health counseling, as reflected in patient instructions       Regular exercise       Healthy diet/nutrition       Colon cancer screening    Estimated body mass index is 35.12 kg/m  as calculated from the  "following:    Height as of this encounter: 1.689 m (5' 6.5\").    Weight as of this encounter: 100.2 kg (220 lb 14.4 oz).    Weight management plan: Discussed healthy diet and exercise guidelines    She reports that she has never smoked. She has never used smokeless tobacco.      Counseling Resources:  ATP IV Guidelines  Pooled Cohorts Equation Calculator  Breast Cancer Risk Calculator  BRCA-Related Cancer Risk Assessment: FHS-7 Tool  FRAX Risk Assessment  ICSI Preventive Guidelines  Dietary Guidelines for Americans, 2010  USDA's MyPlate  ASA Prophylaxis  Lung CA Screening    Yari Ruiz MD  [facility]  "

## 2021-05-04 NOTE — TELEPHONE ENCOUNTER
Summary:    Patient is due/failing the following:   COLONOSCOPY and MAMMOGRAM    Reviewed:  [x] CARE EVERYWHERE  [] LAST OV NOTE INCLUDING ENDO  [] FYI TAB  [] LAST PANEL ENCOUNTER  [] FUTURE APTS  [] MYCHART STATUS   [] IMMUNIZATIONS  Action needed:   Patient needs office visit for Mammo.Colonoscopy    Type of outreach:    Spoke to pt when in clinic and apt made for mammo,and orders placed for colonoscopy                                                                               Darleen Arce MA  [facility].

## 2021-05-05 ENCOUNTER — ANCILLARY PROCEDURE (OUTPATIENT)
Dept: MAMMOGRAPHY | Facility: CLINIC | Age: 66
End: 2021-05-05
Payer: COMMERCIAL

## 2021-05-05 DIAGNOSIS — Z11.4 SCREENING FOR HIV (HUMAN IMMUNODEFICIENCY VIRUS): ICD-10-CM

## 2021-05-05 DIAGNOSIS — Z11.59 NEED FOR HEPATITIS C SCREENING TEST: ICD-10-CM

## 2021-05-05 DIAGNOSIS — Z78.9 VEGETARIAN DIET: ICD-10-CM

## 2021-05-05 DIAGNOSIS — Z00.00 ROUTINE GENERAL MEDICAL EXAMINATION AT A HEALTH CARE FACILITY: ICD-10-CM

## 2021-05-05 DIAGNOSIS — Z12.31 ENCOUNTER FOR SCREENING MAMMOGRAM FOR BREAST CANCER: ICD-10-CM

## 2021-05-05 LAB
ALBUMIN SERPL-MCNC: 3.8 G/DL (ref 3.4–5)
ALP SERPL-CCNC: 124 U/L (ref 40–150)
ALT SERPL W P-5'-P-CCNC: 20 U/L (ref 0–50)
ANION GAP SERPL CALCULATED.3IONS-SCNC: 2 MMOL/L (ref 3–14)
AST SERPL W P-5'-P-CCNC: 13 U/L (ref 0–45)
BILIRUB SERPL-MCNC: 0.7 MG/DL (ref 0.2–1.3)
BUN SERPL-MCNC: 12 MG/DL (ref 7–30)
CALCIUM SERPL-MCNC: 10.1 MG/DL (ref 8.5–10.1)
CHLORIDE SERPL-SCNC: 105 MMOL/L (ref 94–109)
CHOLEST SERPL-MCNC: 227 MG/DL
CO2 SERPL-SCNC: 30 MMOL/L (ref 20–32)
CREAT SERPL-MCNC: 0.79 MG/DL (ref 0.52–1.04)
ERYTHROCYTE [DISTWIDTH] IN BLOOD BY AUTOMATED COUNT: 12.1 % (ref 10–15)
GFR SERPL CREATININE-BSD FRML MDRD: 79 ML/MIN/{1.73_M2}
GLUCOSE SERPL-MCNC: 87 MG/DL (ref 70–99)
HBA1C MFR BLD: 5.3 % (ref 0–5.6)
HCT VFR BLD AUTO: 43.3 % (ref 35–47)
HCV AB SERPL QL IA: NONREACTIVE
HDLC SERPL-MCNC: 57 MG/DL
HGB BLD-MCNC: 14.7 G/DL (ref 11.7–15.7)
HIV 1+2 AB+HIV1 P24 AG SERPL QL IA: NONREACTIVE
LDLC SERPL CALC-MCNC: 148 MG/DL
MCH RBC QN AUTO: 31.9 PG (ref 26.5–33)
MCHC RBC AUTO-ENTMCNC: 33.9 G/DL (ref 31.5–36.5)
MCV RBC AUTO: 94 FL (ref 78–100)
NONHDLC SERPL-MCNC: 170 MG/DL
PLATELET # BLD AUTO: 257 10E9/L (ref 150–450)
POTASSIUM SERPL-SCNC: 4 MMOL/L (ref 3.4–5.3)
PROT SERPL-MCNC: 7.7 G/DL (ref 6.8–8.8)
RBC # BLD AUTO: 4.61 10E12/L (ref 3.8–5.2)
SODIUM SERPL-SCNC: 137 MMOL/L (ref 133–144)
TRIGL SERPL-MCNC: 112 MG/DL
TSH SERPL DL<=0.005 MIU/L-ACNC: 2.54 MU/L (ref 0.4–4)
VIT B12 SERPL-MCNC: 285 PG/ML (ref 193–986)
WBC # BLD AUTO: 5.5 10E9/L (ref 4–11)

## 2021-05-05 PROCEDURE — 77063 BREAST TOMOSYNTHESIS BI: CPT | Mod: TC | Performed by: RADIOLOGY

## 2021-05-05 PROCEDURE — 80053 COMPREHEN METABOLIC PANEL: CPT | Performed by: FAMILY MEDICINE

## 2021-05-05 PROCEDURE — 86803 HEPATITIS C AB TEST: CPT | Performed by: FAMILY MEDICINE

## 2021-05-05 PROCEDURE — 80061 LIPID PANEL: CPT | Performed by: FAMILY MEDICINE

## 2021-05-05 PROCEDURE — 77067 SCR MAMMO BI INCL CAD: CPT | Mod: TC | Performed by: RADIOLOGY

## 2021-05-05 PROCEDURE — 36415 COLL VENOUS BLD VENIPUNCTURE: CPT | Performed by: FAMILY MEDICINE

## 2021-05-05 PROCEDURE — 87389 HIV-1 AG W/HIV-1&-2 AB AG IA: CPT | Performed by: FAMILY MEDICINE

## 2021-05-05 PROCEDURE — 84443 ASSAY THYROID STIM HORMONE: CPT | Performed by: FAMILY MEDICINE

## 2021-05-05 PROCEDURE — 83036 HEMOGLOBIN GLYCOSYLATED A1C: CPT | Performed by: FAMILY MEDICINE

## 2021-05-05 PROCEDURE — 85027 COMPLETE CBC AUTOMATED: CPT | Performed by: FAMILY MEDICINE

## 2021-05-05 PROCEDURE — 82607 VITAMIN B-12: CPT | Performed by: FAMILY MEDICINE

## 2021-05-06 DIAGNOSIS — I10 BENIGN ESSENTIAL HYPERTENSION: ICD-10-CM

## 2021-05-06 DIAGNOSIS — E03.9 HYPOTHYROIDISM, UNSPECIFIED TYPE: ICD-10-CM

## 2021-05-06 RX ORDER — HYDROCHLOROTHIAZIDE 12.5 MG/1
12.5 TABLET ORAL DAILY
Qty: 90 TABLET | Refills: 3 | Status: SHIPPED | OUTPATIENT
Start: 2021-05-06 | End: 2022-07-19

## 2021-05-06 RX ORDER — LEVOTHYROXINE SODIUM 125 UG/1
125 TABLET ORAL DAILY
Qty: 90 TABLET | Refills: 3 | Status: SHIPPED | OUTPATIENT
Start: 2021-05-06 | End: 2022-07-19

## 2021-05-07 ENCOUNTER — HOSPITAL ENCOUNTER (OUTPATIENT)
Dept: ULTRASOUND IMAGING | Facility: CLINIC | Age: 66
Discharge: HOME | End: 2021-05-07
Attending: FAMILY MEDICINE | Admitting: FAMILY MEDICINE
Payer: COMMERCIAL

## 2021-05-07 DIAGNOSIS — R92.8 ABNORMAL MAMMOGRAM: ICD-10-CM

## 2021-05-07 PROCEDURE — 76642 ULTRASOUND BREAST LIMITED: CPT | Mod: RT

## 2021-05-18 ENCOUNTER — MYC MEDICAL ADVICE (OUTPATIENT)
Dept: FAMILY MEDICINE | Facility: CLINIC | Age: 66
End: 2021-05-18

## 2021-05-18 ENCOUNTER — NURSE TRIAGE (OUTPATIENT)
Dept: FAMILY MEDICINE | Facility: CLINIC | Age: 66
End: 2021-05-18

## 2021-05-18 NOTE — TELEPHONE ENCOUNTER
[employer] message sent to patient after huddle with provider and review of nurse triage note 5/18/21.     Ismael SNYDER RN

## 2021-05-18 NOTE — TELEPHONE ENCOUNTER
S-(situation): Please see patient Te message 5/18/21. Patient had sharp deep pain in back of left thigh last Thursday evening. Patient continues to have slight pain, more so when sitting, in left back thigh. Patient is concerns for DVT.     B-(background): No personal hx of DVT or family hx of blood disorders aside from hypereosinophilia from father (which was informed to patient not to be genetic).     A-(assessment): See below. Mild pain 1/10; no noticeable swelling, no redness/numbness/weakness.     R-(recommendations): Per protocol, patient wanting to be seen and protocol allows scheduling today or tomorrow in office. Scheduled w/ L.G. 5/19/21 7:25am. Patient agreed with plan and no further questions or concerns at this time.       Reason for Disposition    Patient wants to be seen    Additional Information    Negative: Looks like a broken bone or dislocated joint (e.g., crooked or deformed)    Negative: Sounds like a life-threatening emergency to the triager    Negative: Followed a hip injury    Negative: Followed a knee injury    Negative: Followed an ankle or foot injury    Negative: Back pain radiating (shooting) into leg(s)    Negative: Foot pain is the main symptom    Negative: Ankle pain is the main symptom    Negative: Knee pain is the main symptom    Negative: Leg swelling is the main symptom    Negative: Chest pain    Negative: Difficulty breathing    Negative: Entire foot is cool or blue in comparison to other side    Negative: Unable to walk    Negative: Fever and red area (or area very tender to touch)    Negative: Fever and swollen joint    Negative: Thigh or calf pain in only one leg and present > 1 hour    Negative: Thigh, calf, or ankle swelling in only one leg    Negative: Thigh, calf, or ankle swelling in both legs, but one side is definitely more swollen    Negative: History of prior 'blood clot' in leg or lungs (i.e., deep vein thrombosis, pulmonary embolism)    Negative: History of  "inherited increased risk of blood clots (e.g., factor 5 Leiden, antithrombin 3, protein C or protein S deficiency, prothrombin mutation)    Negative: Major surgery in the past month    Negative: Hip or leg fracture (broken bone) in past month (or had cast on leg or ankle in past month)    Negative: Illness requiring prolonged bedrest in past month (e.g., immobilization, long hospital stay)    Negative: Long-distance travel in past month (e.g., car, bus, train, plane; with trip lasting 6 or more hours)    Negative: Cancer treatment in the past two months (or has cancer now)    Negative: Patient sounds very sick or weak to the triager    Negative: SEVERE pain (e.g., excruciating, unable to do any normal activities)    Negative: Cast on leg or ankle and now has increasing pain    Negative: Red area or streak and large (> 2 in. or 5 cm)    Negative: Painful rash with multiple small blisters grouped together (i.e., dermatomal distribution or 'band' or 'stripe')    Negative: Looks like a boil, infected sore, deep ulcer, or other infected rash (spreading redness, pus)    Negative: Localized rash is very painful (no fever)    Negative: Numbness in a leg or foot (i.e., loss of sensation)    Negative: Localized pain, redness or hard lump along vein    Answer Assessment - Initial Assessment Questions  1. ONSET: \"When did the pain start?\"       Last thursday  2. LOCATION: \"Where is the pain located?\"       Located in the back of your left thigh midway between buttock and knee   3. PAIN: \"How bad is the pain?\"    (Scale 1-10; or mild, moderate, severe)    -  MILD (1-3): doesn't interfere with normal activities     -  MODERATE (4-7): interferes with normal activities (e.g., work or school) or awakens from sleep, limping     -  SEVERE (8-10): excruciating pain, unable to do any normal activities, unable to walk      Mild, sitting 1/10  4. WORK OR EXERCISE: \"Has there been any recent work or exercise that involved this part of the " "body?\"       Nothing different than usual  5. CAUSE: \"What do you think is causing the leg pain?\"      I wonder about DVT, I have a sedentary career  6. OTHER SYMPTOMS: \"Do you have any other symptoms?\" (e.g., chest pain, back pain, breathing difficulty, swelling, rash, fever, numbness, weakness)      no  7. PREGNANCY: \"Is there any chance you are pregnant?\" \"When was your last menstrual period?\"      NA    Protocols used: LEG PAIN-A-OH    Ismael SNYDER RN    "

## 2021-05-18 NOTE — TELEPHONE ENCOUNTER
MyChart message marked as read by patient at 3:01 PM on 5/18/2021. See other [employer] message 5/18/21. No further questions or concerns from patient at this time.     Ismael SNYDER RN

## 2021-05-18 NOTE — TELEPHONE ENCOUNTER
See other [facility] [employer] message 5/18/21. No further questions or concerns from patient at this time.     Ismael SNYDER RN

## 2021-05-19 ENCOUNTER — OFFICE VISIT (OUTPATIENT)
Dept: FAMILY MEDICINE | Facility: CLINIC | Age: 66
End: 2021-05-19
Payer: COMMERCIAL

## 2021-05-19 VITALS
HEART RATE: 70 BPM | TEMPERATURE: 97.7 F | DIASTOLIC BLOOD PRESSURE: 74 MMHG | BODY MASS INDEX: 34.82 KG/M2 | WEIGHT: 219 LBS | OXYGEN SATURATION: 97 % | SYSTOLIC BLOOD PRESSURE: 113 MMHG

## 2021-05-19 DIAGNOSIS — M79.652 PAIN OF LEFT THIGH: Primary | ICD-10-CM

## 2021-05-19 LAB
ANION GAP SERPL CALCULATED.3IONS-SCNC: 5 MMOL/L (ref 3–14)
BUN SERPL-MCNC: 12 MG/DL (ref 7–30)
CALCIUM SERPL-MCNC: 10 MG/DL (ref 8.5–10.1)
CHLORIDE SERPL-SCNC: 102 MMOL/L (ref 94–109)
CO2 SERPL-SCNC: 27 MMOL/L (ref 20–32)
CREAT SERPL-MCNC: 0.87 MG/DL (ref 0.52–1.04)
D DIMER PPP FEU-MCNC: <0.3 UG/ML FEU (ref 0–0.5)
GFR SERPL CREATININE-BSD FRML MDRD: 70 ML/MIN/{1.73_M2}
GLUCOSE SERPL-MCNC: 96 MG/DL (ref 70–99)
MAGNESIUM SERPL-MCNC: 2.3 MG/DL (ref 1.6–2.3)
POTASSIUM SERPL-SCNC: 3.5 MMOL/L (ref 3.4–5.3)
SODIUM SERPL-SCNC: 134 MMOL/L (ref 133–144)

## 2021-05-19 PROCEDURE — 99213 OFFICE O/P EST LOW 20 MIN: CPT | Performed by: PHYSICIAN ASSISTANT

## 2021-05-19 PROCEDURE — 36415 COLL VENOUS BLD VENIPUNCTURE: CPT | Performed by: PHYSICIAN ASSISTANT

## 2021-05-19 PROCEDURE — 85379 FIBRIN DEGRADATION QUANT: CPT | Performed by: PHYSICIAN ASSISTANT

## 2021-05-19 PROCEDURE — 83735 ASSAY OF MAGNESIUM: CPT | Performed by: PHYSICIAN ASSISTANT

## 2021-05-19 PROCEDURE — 80048 BASIC METABOLIC PNL TOTAL CA: CPT | Performed by: PHYSICIAN ASSISTANT

## 2021-05-19 NOTE — PATIENT INSTRUCTIONS
If not a blood clot likely muscular pain in nature. Stay hydrated and can take magnesium 250-500 mg for muscle spasm.  Can use ibuprofen/Tylenol for pain. Ice and heat are also helpful, 20 minutes at a time.

## 2021-05-19 NOTE — PROGRESS NOTES
Assessment & Plan     Pain of left thigh  Patient's overall risk is low for DVT. I do think it is reasonable to obtain further testing. I discussed with patient options including obtaining d-dimer vs ultrasound. Given her low risk d-dimer is appropriate and patient would prefer to start with that. D-dimer negative. Discussed at length with patient that if this is not a DVT then muscular etiology for the pain is likely. I reviewed with patient interventions that will be helpful for her pain (see patient instructions).  Electrolytes also obtained but pending. We will call patient with results when they return.  - D dimer, quantitative  - Basic metabolic panel  (Ca, Cl, CO2, Creat, Gluc, K, Na, BUN)  - Magnesium    Review of the result(s) of each unique test - d-dimer  Ordering of each unique test  24 minutes spent on the date of the encounter doing chart review, history and exam, documentation and further activities per the note       Patient Instructions   If not a blood clot likely muscular pain in nature. Stay hydrated and can take magnesium 250-500 mg for muscle spasm.  Can use ibuprofen/Tylenol for pain. Ice and heat are also helpful, 20 minutes at a time.      Return if symptoms worsen or fail to improve.    Rosalia Salas PA-C  [facility]     Clare Wallace is a 65 year old female who presents to clinic today for the following health issues     History of Present Illness       She eats 2-3 servings of fruits and vegetables daily.She consumes 0 sweetened beverage(s) daily.She exercises with enough effort to increase her heart rate 30 to 60 minutes per day.  She exercises with enough effort to increase her heart rate 4 days per week.   She is taking medications regularly.       Musculoskeletal problem/pain  Onset/Duration: happen on 5/13/21 shooting pain at night (did not feel like a cramp or brant horse)- lasted 1-2 minutes. She has in the past few days has been  sore and worsening  Description  Location: thigh - left  Joint Swelling: no  Redness: no  Pain: YES  Warmth: no  Intensity:  1/10 currently   Progression of Symptoms:  same  Accompanying signs and symptoms:   Fevers: no  Numbness/tingling/weakness: no  History  Trauma to the area: no  Recent illness:  no  Previous similar problem: no  Previous evaluation:  no  Precipitating or alleviating factors:  Aggravating factors include: sitting  Therapies tried and outcome: nothing    Mother has history of PE. Patient denies any history of blood clots. No recent surgery or hormones. She is in a car 2-3 times weekly for 150 mile car ride. She sits at her desk often (for work).    Review of Systems   GENERAL:  No fevers  MUSCULOSKELETAL: As noted in HPI          Objective    /74   Pulse 70   Temp 97.7  F (36.5  C) (Oral)   Wt 99.3 kg (219 lb)   SpO2 97%   BMI 34.82 kg/m    Body mass index is 34.82 kg/m .  Physical Exam   GENERAL: No acute distress  HEENT: Normocephalic  EXTREMITIES: Varicostities of the bilateral legs  Right lower extremity: Circumference of calf 44 cm.  Left lower extremity: Circumference of calf 44 cm. No pitting edema. Leg does not appear edematous. Tenderness middle posterior thigh without any mass, erythema or warmth over the area.  NEURO: Alert, non-focal      Results for orders placed or performed in visit on 05/19/21 (from the past 24 hour(s))   D dimer, quantitative   Result Value Ref Range    D Dimer <0.3 0.0 - 0.50 ug/ml FEU

## 2021-05-22 DIAGNOSIS — Z11.59 ENCOUNTER FOR SCREENING FOR OTHER VIRAL DISEASES: ICD-10-CM

## 2021-06-07 DIAGNOSIS — Z11.59 ENCOUNTER FOR SCREENING FOR OTHER VIRAL DISEASES: Primary | ICD-10-CM

## 2021-06-10 ENCOUNTER — MYC MEDICAL ADVICE (OUTPATIENT)
Dept: FAMILY MEDICINE | Facility: CLINIC | Age: 66
End: 2021-06-10

## 2021-06-11 NOTE — TELEPHONE ENCOUNTER
cancelled appt for today at the lab per patient    Kendra Bryson/[facility]---[facility]

## 2021-06-15 ENCOUNTER — TELEPHONE (OUTPATIENT)
Dept: FAMILY MEDICINE | Facility: CLINIC | Age: 66
End: 2021-06-15

## 2021-06-15 NOTE — TELEPHONE ENCOUNTER
Patient Quality Outreach Summary      Summary:    Patient is due/failing the following:   Cervical Cancer Screening - PAP Needed    Type of outreach:    [facility] 9/23/19    Questions for provider review:    None                                                                                                                    Megan Olivares MA       Chart routed to none.

## 2021-06-18 DIAGNOSIS — Z11.59 ENCOUNTER FOR SCREENING FOR OTHER VIRAL DISEASES: ICD-10-CM

## 2021-06-18 LAB
SARS-COV-2 RNA RESP QL NAA+PROBE: NORMAL
SPECIMEN SOURCE: NORMAL

## 2021-06-18 PROCEDURE — U0005 INFEC AGEN DETEC AMPLI PROBE: HCPCS | Performed by: INTERNAL MEDICINE

## 2021-06-18 PROCEDURE — U0003 INFECTIOUS AGENT DETECTION BY NUCLEIC ACID (DNA OR RNA); SEVERE ACUTE RESPIRATORY SYNDROME CORONAVIRUS 2 (SARS-COV-2) (CORONAVIRUS DISEASE [COVID-19]), AMPLIFIED PROBE TECHNIQUE, MAKING USE OF HIGH THROUGHPUT TECHNOLOGIES AS DESCRIBED BY CMS-2020-01-R: HCPCS | Performed by: INTERNAL MEDICINE

## 2021-06-19 LAB
LABORATORY COMMENT REPORT: NORMAL
SARS-COV-2 RNA RESP QL NAA+PROBE: NEGATIVE
SPECIMEN SOURCE: NORMAL

## 2021-06-21 ENCOUNTER — HOSPITAL ENCOUNTER (OUTPATIENT)
Facility: CLINIC | Age: 66
Discharge: HOME | End: 2021-06-21
Attending: INTERNAL MEDICINE | Admitting: INTERNAL MEDICINE
Payer: COMMERCIAL

## 2021-06-21 VITALS
DIASTOLIC BLOOD PRESSURE: 74 MMHG | HEART RATE: 51 BPM | HEIGHT: 67 IN | RESPIRATION RATE: 16 BRPM | BODY MASS INDEX: 34.37 KG/M2 | WEIGHT: 219 LBS | TEMPERATURE: 98 F | OXYGEN SATURATION: 100 % | SYSTOLIC BLOOD PRESSURE: 101 MMHG

## 2021-06-21 LAB — COLONOSCOPY: NORMAL

## 2021-06-21 PROCEDURE — 250N000011 HC RX IP 250 OP 636: Performed by: INTERNAL MEDICINE

## 2021-06-21 PROCEDURE — 88305 TISSUE EXAM BY PATHOLOGIST: CPT | Mod: 26 | Performed by: PATHOLOGY

## 2021-06-21 PROCEDURE — G0500 MOD SEDAT ENDO SERVICE >5YRS: HCPCS | Performed by: INTERNAL MEDICINE

## 2021-06-21 PROCEDURE — 45380 COLONOSCOPY AND BIOPSY: CPT | Performed by: INTERNAL MEDICINE

## 2021-06-21 PROCEDURE — 99153 MOD SED SAME PHYS/QHP EA: CPT | Performed by: INTERNAL MEDICINE

## 2021-06-21 PROCEDURE — 88305 TISSUE EXAM BY PATHOLOGIST: CPT | Mod: TC | Performed by: INTERNAL MEDICINE

## 2021-06-21 PROCEDURE — 45385 COLONOSCOPY W/LESION REMOVAL: CPT | Mod: PT | Performed by: INTERNAL MEDICINE

## 2021-06-21 RX ORDER — ONDANSETRON 2 MG/ML
4 INJECTION INTRAMUSCULAR; INTRAVENOUS EVERY 6 HOURS PRN
Status: DISCONTINUED | OUTPATIENT
Start: 2021-06-21 | End: 2021-06-21 | Stop reason: HOSPADM

## 2021-06-21 RX ORDER — ONDANSETRON 2 MG/ML
4 INJECTION INTRAMUSCULAR; INTRAVENOUS
Status: DISCONTINUED | OUTPATIENT
Start: 2021-06-21 | End: 2021-06-21 | Stop reason: HOSPADM

## 2021-06-21 RX ORDER — ONDANSETRON 4 MG/1
4 TABLET, ORALLY DISINTEGRATING ORAL EVERY 6 HOURS PRN
Status: DISCONTINUED | OUTPATIENT
Start: 2021-06-21 | End: 2021-06-21 | Stop reason: HOSPADM

## 2021-06-21 RX ORDER — LIDOCAINE 40 MG/G
CREAM TOPICAL
Status: DISCONTINUED | OUTPATIENT
Start: 2021-06-21 | End: 2021-06-21 | Stop reason: HOSPADM

## 2021-06-21 RX ORDER — NALOXONE HYDROCHLORIDE 0.4 MG/ML
0.2 INJECTION, SOLUTION INTRAMUSCULAR; INTRAVENOUS; SUBCUTANEOUS
Status: DISCONTINUED | OUTPATIENT
Start: 2021-06-21 | End: 2021-06-21 | Stop reason: HOSPADM

## 2021-06-21 RX ORDER — FLUMAZENIL 0.1 MG/ML
0.2 INJECTION, SOLUTION INTRAVENOUS
Status: DISCONTINUED | OUTPATIENT
Start: 2021-06-21 | End: 2021-06-21 | Stop reason: HOSPADM

## 2021-06-21 RX ORDER — PROCHLORPERAZINE MALEATE 10 MG
10 TABLET ORAL EVERY 6 HOURS PRN
Status: DISCONTINUED | OUTPATIENT
Start: 2021-06-21 | End: 2021-06-21 | Stop reason: HOSPADM

## 2021-06-21 RX ORDER — NALOXONE HYDROCHLORIDE 0.4 MG/ML
0.4 INJECTION, SOLUTION INTRAMUSCULAR; INTRAVENOUS; SUBCUTANEOUS
Status: DISCONTINUED | OUTPATIENT
Start: 2021-06-21 | End: 2021-06-21 | Stop reason: HOSPADM

## 2021-06-21 RX ORDER — FENTANYL CITRATE 50 UG/ML
INJECTION, SOLUTION INTRAMUSCULAR; INTRAVENOUS PRN
Status: COMPLETED | OUTPATIENT
Start: 2021-06-21 | End: 2021-06-21

## 2021-06-21 RX ADMIN — MIDAZOLAM 2 MG: 1 INJECTION INTRAMUSCULAR; INTRAVENOUS at 11:43

## 2021-06-21 RX ADMIN — FENTANYL CITRATE 100 MCG: 50 INJECTION, SOLUTION INTRAMUSCULAR; INTRAVENOUS at 11:43

## 2021-06-21 ASSESSMENT — MIFFLIN-ST. JEOR: SCORE: 1573.07

## 2021-06-21 NOTE — LETTER
May 18, 2021      Clare Wallace  [address]  [facility] [address]        Dear Clare,     Please be aware that coverage of these services is subject to the terms and limitations of your health insurance plan.  Call member services at your health plan with any benefit or coverage questions.    Thank you for choosing [facility]. You are scheduled for the following service(s):    Date:  6/14/2021             Procedure:  COLONOSCOPY  Doctor:        Dr. Heriberto Andrade   Arrival Time:  1:30  *Enter and check in at the Main Hospital Entrance*  Procedure Time:  2:00     Location:   [facility]        Endoscopy Department, First Floor         [address] [place], [place] [address]      [phone] or [phone] ([facility]) to reschedule          MIRALAX -GATORADE  PREP  Colonoscopy is the most accurate test to detect colon polyps and colon cancer; and the only test where polyps can be removed. During this procedure, a doctor examines the lining of your large intestine and rectum through a flexible tube.   Transportation  You must arrange for a ride for the day of your procedure with a responsible adult. A taxi , Uber, etc, is not an option unless you are accompanied by a responsible adult. If you fail to arrange transportation with a responsible adult, your procedure will be cancelled and rescheduled.    Purchase the  following supplies at your local pharmacy:  - 2 (two) bisacodyl tablets: each tablet contains 5 mg.  (Dulcolax  laxative NOT Dulcolax  stool softener)   - 1 (one) 8.3 oz bottle of Polyethylene Glycol (PEG) 3350 Powder   (MiraLAX , Smooth LAX , ClearLAX  or equivalent)  - 64 oz Gatorade    Regular Gatorade, Gatorade G2 , Powerade , Powerade Zero  or Pedialyte  is acceptable. Red colored flavors are not allowed; all other colors (yellow, green, orange, purple and blue) are okay. It is also okay to buy two 2.12 oz packets of powdered Gatorade that  can be mixed with water to a total volume of 64 oz of liquid.  - 1 (one) 10 oz bottle of Magnesium Citrate (Red colored flavors are not allowed)  It is also okay for you to use a 0.5 oz package of powdered magnesium citrate (17 g) mixed with 10 oz of water.      PREPARATION FOR COLONOSCOPY    7 days before:    Discontinue fiber supplements and medications containing iron. This includes Metamucil  and Fibercon ; and multivitamins with iron.    3 days before:    Begin a low-fiber diet. A low-fiber diet helps making the cleanout more effective.     Examples of a low-fiber diet include (but are not limited to): white bread, white rice, pasta, crackers, fish, chicken, eggs, ground beef, creamy peanut butter, cooked/steamed/boiled vegetables, canned fruit, bananas, melons, milk, plain yogurt cheese, salad dressing and other condiments.     The following are not allowed on a low-fiber diet: seeds, nuts, popcorn, bran, whole wheat, corn, quinoa, raw fruits and vegetables, berries and dried fruit, beans and lentils.    For additional details on low-fiber diet, please refer to the table on the last page.    2 days before:    Continue the low-fiber diet.     Drink at least 8 glasses of water throughout the day.     Stop eating solid foods at 11:45 pm.    1 day before:    In the morning: begin a clear liquid diet (liquids you can see through).     Examples of a clear liquid diet include: water, clear broth or bouillon, Gatorade, Pedialyte or Powerade, carbonated and non-carbonated soft drinks (Sprite , 7-Up , ginger ale), strained fruit juices without pulp (apple, white grape, white cranberry), Jell-O  and popsicles.     The following are not allowed on a clear liquid diet: red liquids, alcoholic beverages, dairy products (milk, creamer, and yogurt), protein shakes, creamy broths, juice with pulp and chewing tobacco.    At noon: take 2 (two) bisacodyl tablets     At 4 (and no later than 6pm): start drinking the Miralax-Gatorade  preparation (8.3 oz of Miralax mixed with 64 oz of Gatorade in a large pitcher). Drink 1(one) 8 oz glass every 15 minutes thereafter, until the mixture is gone.    COLON CLEANSING TIPS: drink adequate amounts of fluids before and after your colon cleansing to prevent dehydration. Stay near a toilet because you will have diarrhea. Even if you are sitting on the toilet, continue to drink the cleansing solution every 15 minutes. If you feel nauseous or vomit, rinse your mouth with water, take a 15 to 30-minute-break and then continue drinking the solution. You will be uncomfortable until the stool has flushed from your colon (in about 2 to 4 hours). You may feel chilled.    Day of your procedure  You may take all of your morning medications including blood pressure medications, blood thinners (if you have not been instructed to stop these by our office), methadone, anti-seizure medications with sips of water 3 hours prior to your procedure or earlier. Do not take insulin or vitamins prior to your procedure. Continue the clear liquid diet.       4 hours prior: drink 10 oz of magnesium citrate. It may be easier to drink it with a straw.    STOP consuming all liquids after that.     Do not take anything by mouth during this time.     Allow extra time to travel to your procedure as you may need to stop and use a restroom along the way.    You are ready for the procedure, if you followed all instructions and your stool is no longer formed, but clear or yellow liquid. If you are unsure whether your colon is clean, please call our office at [phone] before you leave for your appointment.    Bring the following to your procedure:  - Insurance Card/Photo ID.   - List of current medications including over-the-counter medications and supplements.   - Your rescue inhaler if you currently use one to control asthma.    Canceling or rescheduling your appointment:   If you must cancel or reschedule your appointment, please call  [phone] as soon as possible.      COLONOSCOPY PRE-PROCEDURE CHECKLIST    If you have diabetes, ask your regular doctor for diet and medication restrictions.  If you take an anticoagulant or anti-platelet medication (such as Coumadin , Lovenox , Pradaxa , Xarelto , Eliquis , etc.), please call your primary doctor for advice on holding this medication.  If you take aspirin you may continue to do so.  If you are or may be pregnant, please discuss the risks and benefits of this procedure with your doctor.        What happens during a colonoscopy?    Plan to spend up to two hours, starting at registration time, at the endoscopy center the day of your procedure. The colonoscopy takes an average of 15 to 30 minutes. Recovery time is about 30 minutes.      Before the exam:    You will change into a gown.    Your medical history and medication list will be reviewed with you, unless that has been done over the phone prior to the procedure.     A nurse will insert an intravenous (IV) line into your hand or arm.    The doctor will meet with you and will give you a consent form to sign.  During the exam:     Medicine will be given through the IV line to help you relax.     Your heart rate and oxygen levels will be monitored. If your blood pressure is low, you may be given fluids through the IV line.     The doctor will insert a flexible hollow tube, called a colonoscope, into your rectum. The scope will be advanced slowly through the large intestine (colon).    You may have a feeling of fullness or pressure.     If an abnormal tissue or a polyp is found, the doctor may remove it through the endoscope for closer examination, or biopsy. Tissue removal is painless    After the exam:           Any tissue samples removed during the exam will be sent to a lab for evaluation. It may take 5-7 working days for you to be notified of the results.     A nurse will provide you with complete discharge instructions before you leave the  endoscopy center. Be sure to ask the nurse for specific instructions if you take blood thinners such as Aspirin, Coumadin or Plavix.     The doctor will prepare a full report for you and for the physician who referred you for the procedure.     Your doctor will talk with you about the initial results of your exam.      Medication given during the exam will prohibit you from driving for the rest of the day.     Following the exam, you may resume your normal diet. Your first meal should be light, no greasy foods. Avoid alcohol until the next day.     You may resume your regular activities the day after the procedure.         LOW-FIBER DIET    Foods RECOMMENDED Foods to AVOID   Breads, Cereal, Rice and Pasta:   White bread, rolls, biscuits, croissant and harriet toast.   Waffles, Greek toast and pancakes.   White rice, noodles, pasta, macaroni and peeled cooked potatoes.   Plain crackers and saltines.   Cooked cereals: farina, cream of rice.   Cold cereals: Puffed Rice , Rice Krispies , Corn Flakes  and Special K    Breads, Cereal, Rice and Pasta:   Breads or rolls with nuts, seeds or fruit.   Whole wheat, pumpernickel, rye breads and cornbread.   Potatoes with skin, brown or wild rice, and kasha (buckwheat).     Vegetables:   Tender cooked and canned vegetables without seeds: carrots, asparagus tips, green or wax beans, pumpkin, spinach, lima beans. Vegetables:   Raw or steamed vegetables.   Vegetables with seeds.   Sauerkraut.   Winter squash, peas, broccoli, Brussel sprouts, cabbage, onions, cauliflower, baked beans, peas and corn.   Fruits:   Strained fruit juice.   Canned fruit, except pineapple.   Ripe bananas and melon. Fruits:   Prunes and prune juice.   Raw fruits.   Dried fruits: figs, dates and raisins.   Milk/Dairy:   Milk: plain or flavored.   Yogurt, custard and ice cream.   Cheese and cottage cheese Milk/Dairy:     Meat and other proteins:   ground, well-cooked tender beef, lamb, ham, veal, pork, fish,  poultry and organ meats.   Eggs.   Peanut butter without nuts. Meat and other proteins:   Tough, fibrous meats with gristle.   Dry beans, peas and lentils.   Peanut butter with nuts.   Tofu.   Fats, Snack, Sweets, Condiments and Beverages:   Margarine, butter, oils, mayonnaise, sour cream and salad dressing, plain gravy.   Sugar, hard candy, clear jelly, honey and syrup.   Spices, cooked herbs, bouillon, broth and soups made with allowed vegetable, ketchup and mustard.   Coffee, tea and carbonated drinks.   Plain cakes, cookies and pretzels.   Gelatin, plain puddings, custard, ice cream, sherbet and popsicles. Fats, Snack, Sweets, Condiments and Beverages:   Nuts, seeds and coconut.   Jam, marmalade and preserves.   Pickles, olives, relish and horseradish.   All desserts containing nuts, seeds, dried fruit and coconut; or made from whole grains or bran.   Candy made with nuts or seeds.   Popcorn.         DIRECTIONS TO THE ENDOSCOPY DEPARTMENT    From the north ([facility])  Take [address] South, exit on [address] [address]. Get into the left hand [address], turn left (east), go one-half mile to [address] and turn left. Go north to the second stoplight, take a right on [address] and follow it to the Main Hospital entrance.    From the south ([facility])  Take [address]N to the [address]E split and exit on [address] [address]. On [address] [address], turn left (west) to [address]. Turn right (north) on [address]. Go north to the second stoplight, take a right on [address] and follow it to the Main Hospital entrance.    From the east via 35E ([facility])  Take [address] south to [address] [address] exit. Turn right on [address] [address]. Go west to [address]. Turn right (north) on [address]. Go to the second stoplight, take a right on [address] to the Main Hospital entrance.    From the east via [address] ([facility])  Take [address] to [address]. Turn left  (south) on [address] to [address], turn left (east) on [address] and follow it to the Main Hospital entrance.    From the west via [address] ([place], [place])  Take [address] east to [address]. Turn right (south) on [address] to [address], turn left (east) on [address] and follow it to the Main Hospital entrance.

## 2021-06-21 NOTE — LETTER
Mi 15, 2021      Clare Wallace  [address]  [facility] [address]        Dear Clare,     Please be aware that coverage of these services is subject to the terms and limitations of your health insurance plan.  Call member services at your health plan with any benefit or coverage questions.    Thank you for choosing [facility]. You are scheduled for the following service(s):    Date:  6/21/2021             Procedure:  COLONOSCOPY  Doctor:           Arrival Time:  10:50 am *Enter and check in at the Main Hospital Entrance*  Procedure Time:  11:20 am      Location:   [facility]        Endoscopy Department, First Floor         [address] [place], [place] [address]      [phone] or [phone] ([facility]) to reschedule        MIRALAX -GATORADE  PREP  Colonoscopy is the most accurate test to detect colon polyps and colon cancer; and the only test where polyps can be removed. During this procedure, a doctor examines the lining of your large intestine and rectum through a flexible tube.   Transportation  You must arrange for a ride for the day of your procedure with a responsible adult. A taxi , Uber, etc, is not an option unless you are accompanied by a responsible adult. If you fail to arrange transportation with a responsible adult, your procedure will be cancelled and rescheduled.    Purchase the  following supplies at your local pharmacy:  - 2 (two) bisacodyl tablets: each tablet contains 5 mg.  (Dulcolax  laxative NOT Dulcolax  stool softener)   - 1 (one) 8.3 oz bottle of Polyethylene Glycol (PEG) 3350 Powder   (MiraLAX , Smooth LAX , ClearLAX  or equivalent)  - 64 oz Gatorade    Regular Gatorade, Gatorade G2 , Powerade , Powerade Zero  or Pedialyte  is acceptable. Red colored flavors are not allowed; all other colors (yellow, green, orange, purple and blue) are okay. It is also okay to buy two 2.12 oz packets of powdered Gatorade that can  be mixed with water to a total volume of 64 oz of liquid.  - 1 (one) 10 oz bottle of Magnesium Citrate (Red colored flavors are not allowed)  It is also okay for you to use a 0.5 oz package of powdered magnesium citrate (17 g) mixed with 10 oz of water.      PREPARATION FOR COLONOSCOPY    7 days before:    Discontinue fiber supplements and medications containing iron. This includes Metamucil  and Fibercon ; and multivitamins with iron.    3 days before:    Begin a low-fiber diet. A low-fiber diet helps making the cleanout more effective.     Examples of a low-fiber diet include (but are not limited to): white bread, white rice, pasta, crackers, fish, chicken, eggs, ground beef, creamy peanut butter, cooked/steamed/boiled vegetables, canned fruit, bananas, melons, milk, plain yogurt cheese, salad dressing and other condiments.     The following are not allowed on a low-fiber diet: seeds, nuts, popcorn, bran, whole wheat, corn, quinoa, raw fruits and vegetables, berries and dried fruit, beans and lentils.    For additional details on low-fiber diet, please refer to the table on the last page.    2 days before:    Continue the low-fiber diet.     Drink at least 8 glasses of water throughout the day.     Stop eating solid foods at 11:45 pm.    1 day before:    In the morning: begin a clear liquid diet (liquids you can see through).     Examples of a clear liquid diet include: water, clear broth or bouillon, Gatorade, Pedialyte or Powerade, carbonated and non-carbonated soft drinks (Sprite , 7-Up , ginger ale), strained fruit juices without pulp (apple, white grape, white cranberry), Jell-O  and popsicles.     The following are not allowed on a clear liquid diet: red liquids, alcoholic beverages, dairy products (milk, creamer, and yogurt), protein shakes, creamy broths, juice with pulp and chewing tobacco.    At noon: take 2 (two) bisacodyl tablets     At 4 (and no later than 6pm): start drinking the Miralax-Gatorade  preparation (8.3 oz of Miralax mixed with 64 oz of Gatorade in a large pitcher). Drink 1(one) 8 oz glass every 15 minutes thereafter, until the mixture is gone.    COLON CLEANSING TIPS: drink adequate amounts of fluids before and after your colon cleansing to prevent dehydration. Stay near a toilet because you will have diarrhea. Even if you are sitting on the toilet, continue to drink the cleansing solution every 15 minutes. If you feel nauseous or vomit, rinse your mouth with water, take a 15 to 30-minute-break and then continue drinking the solution. You will be uncomfortable until the stool has flushed from your colon (in about 2 to 4 hours). You may feel chilled.    Day of your procedure  You may take all of your morning medications including blood pressure medications, blood thinners (if you have not been instructed to stop these by our office), methadone, anti-seizure medications with sips of water 3 hours prior to your procedure or earlier. Do not take insulin or vitamins prior to your procedure. Continue the clear liquid diet.       4 hours prior: drink 10 oz of magnesium citrate. It may be easier to drink it with a straw.    STOP consuming all liquids after that.     Do not take anything by mouth during this time.     Allow extra time to travel to your procedure as you may need to stop and use a restroom along the way.    You are ready for the procedure, if you followed all instructions and your stool is no longer formed, but clear or yellow liquid. If you are unsure whether your colon is clean, please call our office at [phone] before you leave for your appointment.    Bring the following to your procedure:  - Insurance Card/Photo ID.   - List of current medications including over-the-counter medications and supplements.   - Your rescue inhaler if you currently use one to control asthma.    Canceling or rescheduling your appointment:   If you must cancel or reschedule your appointment, please call  [phone] as soon as possible.      COLONOSCOPY PRE-PROCEDURE CHECKLIST    If you have diabetes, ask your regular doctor for diet and medication restrictions.  If you take an anticoagulant or anti-platelet medication (such as Coumadin , Lovenox , Pradaxa , Xarelto , Eliquis , etc.), please call your primary doctor for advice on holding this medication.  If you take aspirin you may continue to do so.  If you are or may be pregnant, please discuss the risks and benefits of this procedure with your doctor.        What happens during a colonoscopy?    Plan to spend up to two hours, starting at registration time, at the endoscopy center the day of your procedure. The colonoscopy takes an average of 15 to 30 minutes. Recovery time is about 30 minutes.      Before the exam:    You will change into a gown.    Your medical history and medication list will be reviewed with you, unless that has been done over the phone prior to the procedure.     A nurse will insert an intravenous (IV) line into your hand or arm.    The doctor will meet with you and will give you a consent form to sign.  During the exam:     Medicine will be given through the IV line to help you relax.     Your heart rate and oxygen levels will be monitored. If your blood pressure is low, you may be given fluids through the IV line.     The doctor will insert a flexible hollow tube, called a colonoscope, into your rectum. The scope will be advanced slowly through the large intestine (colon).    You may have a feeling of fullness or pressure.     If an abnormal tissue or a polyp is found, the doctor may remove it through the endoscope for closer examination, or biopsy. Tissue removal is painless    After the exam:           Any tissue samples removed during the exam will be sent to a lab for evaluation. It may take 5-7 working days for you to be notified of the results.     A nurse will provide you with complete discharge instructions before you leave the  endoscopy center. Be sure to ask the nurse for specific instructions if you take blood thinners such as Aspirin, Coumadin or Plavix.     The doctor will prepare a full report for you and for the physician who referred you for the procedure.     Your doctor will talk with you about the initial results of your exam.      Medication given during the exam will prohibit you from driving for the rest of the day.     Following the exam, you may resume your normal diet. Your first meal should be light, no greasy foods. Avoid alcohol until the next day.     You may resume your regular activities the day after the procedure.         LOW-FIBER DIET    Foods RECOMMENDED Foods to AVOID   Breads, Cereal, Rice and Pasta:   White bread, rolls, biscuits, croissant and harriet toast.   Waffles, Lebanese toast and pancakes.   White rice, noodles, pasta, macaroni and peeled cooked potatoes.   Plain crackers and saltines.   Cooked cereals: farina, cream of rice.   Cold cereals: Puffed Rice , Rice Krispies , Corn Flakes  and Special K    Breads, Cereal, Rice and Pasta:   Breads or rolls with nuts, seeds or fruit.   Whole wheat, pumpernickel, rye breads and cornbread.   Potatoes with skin, brown or wild rice, and kasha (buckwheat).     Vegetables:   Tender cooked and canned vegetables without seeds: carrots, asparagus tips, green or wax beans, pumpkin, spinach, lima beans. Vegetables:   Raw or steamed vegetables.   Vegetables with seeds.   Sauerkraut.   Winter squash, peas, broccoli, Brussel sprouts, cabbage, onions, cauliflower, baked beans, peas and corn.   Fruits:   Strained fruit juice.   Canned fruit, except pineapple.   Ripe bananas and melon. Fruits:   Prunes and prune juice.   Raw fruits.   Dried fruits: figs, dates and raisins.   Milk/Dairy:   Milk: plain or flavored.   Yogurt, custard and ice cream.   Cheese and cottage cheese Milk/Dairy:     Meat and other proteins:   ground, well-cooked tender beef, lamb, ham, veal, pork, fish,  poultry and organ meats.   Eggs.   Peanut butter without nuts. Meat and other proteins:   Tough, fibrous meats with gristle.   Dry beans, peas and lentils.   Peanut butter with nuts.   Tofu.   Fats, Snack, Sweets, Condiments and Beverages:   Margarine, butter, oils, mayonnaise, sour cream and salad dressing, plain gravy.   Sugar, hard candy, clear jelly, honey and syrup.   Spices, cooked herbs, bouillon, broth and soups made with allowed vegetable, ketchup and mustard.   Coffee, tea and carbonated drinks.   Plain cakes, cookies and pretzels.   Gelatin, plain puddings, custard, ice cream, sherbet and popsicles. Fats, Snack, Sweets, Condiments and Beverages:   Nuts, seeds and coconut.   Jam, marmalade and preserves.   Pickles, olives, relish and horseradish.   All desserts containing nuts, seeds, dried fruit and coconut; or made from whole grains or bran.   Candy made with nuts or seeds.   Popcorn.         DIRECTIONS TO THE ENDOSCOPY DEPARTMENT    From the north ([facility])  Take [address] South, exit on [address] [address]. Get into the left hand [address], turn left (east), go one-half mile to [address] and turn left. Go north to the second stoplight, take a right on [address] and follow it to the Main Hospital entrance.    From the south ([facility])  Take 35N to the 35E split and exit on [address] [address]. On [address] [address], turn left (west) to [address]. Turn right (north) on [address]. Go north to the second stoplight, take a right on [address] and follow it to the Main Hospital entrance.    From the east via 35E ([facility])  Take [address] south to [address] [address] exit. Turn right on [address] [address]. Go west to [address]. Turn right (north) on [address]. Go to the second stoplight, take a right on [address] to the Main Hospital entrance.    From the east via [address] ([facility])  Take [address] to [address]. Turn left  (south) on [address] to [address], turn left (east) on [address] and follow it to the Main Hospital entrance.    From the west via [address] ([place], [place])  Take [address] east to [address]. Turn right (south) on [address] to [address], turn left (east) on [address] and follow it to the Main Hospital entrance.

## 2021-06-21 NOTE — DISCHARGE INSTRUCTIONS

## 2021-06-22 LAB — COPATH REPORT: NORMAL

## 2021-09-09 ENCOUNTER — MYC MEDICAL ADVICE (OUTPATIENT)
Dept: FAMILY MEDICINE | Facility: CLINIC | Age: 66
End: 2021-09-09

## 2021-10-10 ENCOUNTER — HEALTH MAINTENANCE LETTER (OUTPATIENT)
Age: 66
End: 2021-10-10

## 2022-06-10 ENCOUNTER — OFFICE VISIT (OUTPATIENT)
Dept: URGENT CARE | Facility: URGENT CARE | Age: 67
End: 2022-06-10
Payer: COMMERCIAL

## 2022-06-10 VITALS
SYSTOLIC BLOOD PRESSURE: 113 MMHG | DIASTOLIC BLOOD PRESSURE: 72 MMHG | OXYGEN SATURATION: 96 % | HEART RATE: 63 BPM | TEMPERATURE: 97.6 F

## 2022-06-10 DIAGNOSIS — L23.7 CONTACT DERMATITIS DUE TO POISON IVY: Primary | ICD-10-CM

## 2022-06-10 PROCEDURE — 99213 OFFICE O/P EST LOW 20 MIN: CPT | Performed by: FAMILY MEDICINE

## 2022-06-10 RX ORDER — TRIAMCINOLONE ACETONIDE 1 MG/G
CREAM TOPICAL 2 TIMES DAILY
Qty: 60 G | Refills: 4 | Status: SHIPPED | OUTPATIENT
Start: 2022-06-10 | End: 2022-10-13

## 2022-06-10 RX ORDER — PREDNISONE 20 MG/1
TABLET ORAL
Qty: 21 TABLET | Refills: 0 | Status: SHIPPED | OUTPATIENT
Start: 2022-06-10 | End: 2022-10-13

## 2022-06-10 NOTE — PATIENT INSTRUCTIONS
For the itch relief, apply Sarna Lotion or Calamine Lotion onto the itchy areas of the right side of the face.      Continue taking the Claritin (Loratadine) for the itching.  You may take one pill by mouth twice a day for severe itching.      Follow up if there is not improvement over the next 5-7 days.

## 2022-06-10 NOTE — PROGRESS NOTES
SUBJECTIVE:   Clare Wallace is a 66 year old female presenting with a chief complaint of gradual-onset, severe itching, swelling, redness on the right facial cheek  Onset of symptoms was three days days ago.  Course of illness is worsening over the past four days. .    Severity severe.   Current and Associated symptoms: Patient has noticed decreased sleep due to the itching. .    Treatment measures tried include Cortisone cream, Gold Bond, Anti-itch creams, Apple Cider Vinegar.  She has been on Claritin 10 mg daily for a while.    .  Predisposing factors include Patient had been doing yard work five days ago (she mowed an area of the lawn full of weeds) .  No new foods/make-up/creams/lotions/shampoos.  No new animals.  There has been some poison ivy in her yard.      Past Medical History:   Diagnosis Date     Family history of colon cancer     brother     Family history of colonic polyps     mom     Hypothyroidism      Shingles      Current Outpatient Medications   Medication Sig Dispense Refill     hydrochlorothiazide (HYDRODIURIL) 12.5 MG tablet Take 1 tablet (12.5 mg) by mouth daily 90 tablet 3     levothyroxine (SYNTHROID/LEVOTHROID) 125 MCG tablet Take 1 tablet (125 mcg) by mouth daily 90 tablet 3     valACYclovir (VALTREX) 1000 mg tablet Take 1 tablet (1,000 mg) by mouth 2 times daily 180 tablet 1     Social History     Tobacco Use     Smoking status: Never Smoker     Smokeless tobacco: Never Used   Substance Use Topics     Alcohol use: No       ROS:  CONSTITUTIONAL:negative for fevers.   INTEGUMENTARY/SKIN:  Positive for severe itching and redness on the right side of the face.      OBJECTIVE:  /72 (BP Location: Right arm, Patient Position: Sitting, Cuff Size: Adult Large)   Pulse 63   Temp 97.6  F (36.4  C)   SpO2 96%   GENERAL APPEARANCE: healthy, alert and no distress.  No acute respiratory distress.    SKIN: right facial cheek has edema, confluent erythema with overlying vesicles filled with  yellowish clear fluid.    HENT:  The mouth, tongue, oropharynx, uvula are within normal limits.  The oral airway is patent  NECK:  No lymphadenopathy and no edema in the neck.      ASSESSMENT:  Poison ivy contact dermatitis on the right facial cheek.  .    PLAN:  Rx:  Prednisone  Rx:  Triamcinolone 0.1% cream  Sarna Lotion or Calamine Lotion for the itching  Patient can continue taking the Claritin.  follow up if not better in 5-7 days.     Gene Vick MD

## 2022-07-16 ENCOUNTER — HEALTH MAINTENANCE LETTER (OUTPATIENT)
Age: 67
End: 2022-07-16

## 2022-08-30 ENCOUNTER — MYC REFILL (OUTPATIENT)
Dept: FAMILY MEDICINE | Facility: CLINIC | Age: 67
End: 2022-08-30

## 2022-08-30 DIAGNOSIS — I10 BENIGN ESSENTIAL HYPERTENSION: ICD-10-CM

## 2022-08-30 DIAGNOSIS — E03.9 HYPOTHYROIDISM, UNSPECIFIED TYPE: ICD-10-CM

## 2022-08-30 DIAGNOSIS — Z86.19 HISTORY OF SHINGLES: ICD-10-CM

## 2022-08-31 ENCOUNTER — MYC MEDICAL ADVICE (OUTPATIENT)
Dept: FAMILY MEDICINE | Facility: CLINIC | Age: 67
End: 2022-08-31

## 2022-08-31 RX ORDER — HYDROCHLOROTHIAZIDE 12.5 MG/1
12.5 TABLET ORAL DAILY
Qty: 30 TABLET | Refills: 0 | OUTPATIENT
Start: 2022-08-31

## 2022-08-31 RX ORDER — LEVOTHYROXINE SODIUM 125 UG/1
125 TABLET ORAL DAILY
Qty: 30 TABLET | Refills: 0 | OUTPATIENT
Start: 2022-08-31

## 2022-08-31 NOTE — TELEPHONE ENCOUNTER
Routing refill request to provider for review/approval because:  Labs not current:  TSH and BMP  Patient needs to be seen because it has been more than 1 year since last office visit.    Leidy Sanchez RN

## 2022-09-01 RX ORDER — VALACYCLOVIR HYDROCHLORIDE 1 G/1
TABLET, FILM COATED ORAL
Qty: 60 TABLET | Refills: 0 | Status: SHIPPED | OUTPATIENT
Start: 2022-09-01 | End: 2022-10-13

## 2022-09-01 RX ORDER — LEVOTHYROXINE SODIUM 125 UG/1
TABLET ORAL
Qty: 30 TABLET | Refills: 0 | Status: SHIPPED | OUTPATIENT
Start: 2022-09-01 | End: 2022-10-10

## 2022-09-01 RX ORDER — HYDROCHLOROTHIAZIDE 12.5 MG/1
TABLET ORAL
Qty: 30 TABLET | Refills: 0 | Status: SHIPPED | OUTPATIENT
Start: 2022-09-01 | End: 2022-10-10

## 2022-09-15 NOTE — PROGRESS NOTES
ASSESSMENT & PLAN    1. Numbness and tingling in right hand    2. Carpal tunnel syndrome, right      Clare Wallace is a 66 year old right-handed female presenting for evaluation of bilateral hand numbness and tingling (R>L).  History and exam findings were reviewed today, most consistent with carpel tunnel syndrome. Differential also includes possible right-sided cervical radiculopathy with positive Spurling on evaluation today. No red flags today to warrant urgent advanced imaging or intervention.    We reviewed treatment options and plan to proceed with the following:  - An order has been faxed to [facility] in [place] for a nerve conduction test/EMG. Please call the number provided to schedule.   - A right cock-up wrist brace was provided to be worn consistently at night.   - Referral placed for formal hand therapy to work on gentle wrist flexion/extension and finger flexion/extension exercises with pinch/ strengthening exercises along with median nerve glides and use of custom orthotic/splinting/modalities as needed with home exercise program. Please evaluate/discuss posture, chair height and office biomechanics.    Please schedule a follow up appointment to see me in 4-6 weeks for follow up. You may call our direct clinic number ([phone]) at any time with questions or concerns.    Carolyn Larsen MD, [facility]    -----    SUBJECTIVE  Clare Wallace is a/an 66 year old Right handed female who is seen as a self referral for evaluation of bilateral hand pain, numbness and tingling (R>L). The patient is seen by themselves. Patient notes symptoms have been ongoing 1 year. They were infrequent for the first 7 months, but now occur daily. She wakes up every morning with numbness and tingling in the right hand. The left hand is less frequently symptomatic. Symptoms affect all but small finger bilaterally.    Onset: 1 years(s) ago.  "Reports insidious onset without acute precipitating event.  Location of Pain: bilateral hand (R>L)  Rating of Pain at worst: moderate  Rating of Pain Currently: 0/10  Worsened by: sleeping, typing/writing  Better with: rest, stretching  Treatments tried: has tried using her 's velcro wrist brace at night a couple of times without immediate benefit  Associated symptoms: numbness, tingling, pressure sensation; denies weakness, dropping items; denies neck pain  Orthopedic history: NO  Relevant surgical history: NO  Social history: self employed as writer    Past Medical History:   Diagnosis Date     Family history of colon cancer     brother     Family history of colonic polyps     mom     Hypothyroidism      Shingles      Social History     Socioeconomic History     Marital status:    Tobacco Use     Smoking status: Never Smoker     Smokeless tobacco: Never Used   Substance and Sexual Activity     Alcohol use: No     Drug use: No     Sexual activity: Not Currently       Patient's past medical, surgical, social, and family histories were reviewed today and no changes are noted.    REVIEW OF SYSTEMS:  10 point ROS is negative other than symptoms noted above in HPI, Past Medical History or as stated below  Constitutional: NEGATIVE for fever, chills, change in weight  Skin: NEGATIVE for worrisome rashes, moles or lesions  GI/: NEGATIVE for bowel or bladder changes  Neuro: NEGATIVE for weakness, dizziness or paresthesias    OBJECTIVE:  /82 (BP Location: Right arm)   Ht 1.702 m (5' 7\")   Wt 102.1 kg (225 lb)   BMI 35.24 kg/m     General: healthy, alert and in no distress  HEENT: no scleral icterus or conjunctival erythema  Skin: no suspicious lesions or rash. No jaundice.  CV: regular rhythm by palpation  Resp: normal respiratory effort without conversational dyspnea   Psych: normal mood and affect  Gait: normal steady gait with appropriate coordination and balance  Neuro: normal light touch sensory " exam of the bilateral hands.    MSK:  BILATERAL HAND & WRIST  Inspection:    No swelling, bruising, discoloration, or obvious deformity or asymmetry  Palpation:    Tender about the carpal tunnel (R, mild). Remainder of bony and ligamentous line marks are nontender.    Crepitus is Absent    Metacarpals: normal    Thumb: normal    Fingers: normal  Range of Motion:    Wrist: Full (active and passive) flexion, extension, pronation/supination, and ulnar/radial deviation.    Hand: Full active flexion and extension at MCP, PIP, and DIP joints; normal finger cascade without malrotation.   Strength:    No deficits in wrist flexion, extension, ulnar/radial deviation.    No deficits in hand/finger extension, flexion, abduction, adduction, opposition  Special Tests:    Positive: Slightly provocative Phalen's and Tinel's at the right carpal tunnel, slightly provoking right Spurlings    Negative: Phalen's (left), Tinel's (left carpal tunnel), Tinel's (bilat cubital tunnel), thenar eminence wasting, hypothenar eminence wasting     Carolyn Larsen MD [facility]

## 2022-09-18 ENCOUNTER — HEALTH MAINTENANCE LETTER (OUTPATIENT)
Age: 67
End: 2022-09-18

## 2022-09-28 ENCOUNTER — OFFICE VISIT (OUTPATIENT)
Dept: ORTHOPEDICS | Facility: CLINIC | Age: 67
End: 2022-09-28
Payer: COMMERCIAL

## 2022-09-28 VITALS
WEIGHT: 225 LBS | SYSTOLIC BLOOD PRESSURE: 112 MMHG | HEIGHT: 67 IN | DIASTOLIC BLOOD PRESSURE: 82 MMHG | BODY MASS INDEX: 35.31 KG/M2

## 2022-09-28 DIAGNOSIS — G56.01 CARPAL TUNNEL SYNDROME, RIGHT: ICD-10-CM

## 2022-09-28 DIAGNOSIS — R20.0 NUMBNESS AND TINGLING IN RIGHT HAND: Primary | ICD-10-CM

## 2022-09-28 DIAGNOSIS — R20.2 NUMBNESS AND TINGLING IN RIGHT HAND: Primary | ICD-10-CM

## 2022-09-28 PROCEDURE — 99204 OFFICE O/P NEW MOD 45 MIN: CPT | Performed by: STUDENT IN AN ORGANIZED HEALTH CARE EDUCATION/TRAINING PROGRAM

## 2022-09-28 ASSESSMENT — PAIN SCALES - GENERAL: PAINLEVEL: NO PAIN (0)

## 2022-09-28 NOTE — LETTER
9/28/2022         RE: Clare Wallace  [id number]  [facility] [address]        Dear Colleague,    Thank you for referring your patient, Clare Wallace, to the [facility] [place]. Please see a copy of my visit note below.    ASSESSMENT & PLAN    1. Numbness and tingling in right hand    2. Carpal tunnel syndrome, right      Clare Wallace is a 66 year old right-handed female presenting for evaluation of bilateral hand numbness and tingling (R>L).  History and exam findings were reviewed today, most consistent with carpel tunnel syndrome. Differential also includes possible right-sided cervical radiculopathy with positive Spurling on evaluation today. No red flags today to warrant urgent advanced imaging or intervention.    We reviewed treatment options and plan to proceed with the following:  - An order has been faxed to [facility] in [place] for a nerve conduction test/EMG. Please call the number provided to schedule.   - A right cock-up wrist brace was provided to be worn consistently at night.   - Referral placed for formal hand therapy to work on gentle wrist flexion/extension and finger flexion/extension exercises with pinch/ strengthening exercises along with median nerve glides and use of custom orthotic/splinting/modalities as needed with home exercise program. Please evaluate/discuss posture, chair height and office biomechanics.    Please schedule a follow up appointment to see me in 4-6 weeks for follow up. You may call our direct clinic number ([phone]) at any time with questions or concerns.    Carolyn Larsen MD, [facility]    -----    SUBJECTIVE  Clare Wallace is a/an 66 year old Right handed female who is seen as a self referral for evaluation of bilateral hand pain, numbness and tingling (R>L). The patient is seen by themselves. Patient notes symptoms have been ongoing 1  "year. They were infrequent for the first 7 months, but now occur daily. She wakes up every morning with numbness and tingling in the right hand. The left hand is less frequently symptomatic. Symptoms affect all but small finger bilaterally.    Onset: 1 years(s) ago. Reports insidious onset without acute precipitating event.  Location of Pain: bilateral hand (R>L)  Rating of Pain at worst: moderate  Rating of Pain Currently: 0/10  Worsened by: sleeping, typing/writing  Better with: rest, stretching  Treatments tried: has tried using her 's velcro wrist brace at night a couple of times without immediate benefit  Associated symptoms: numbness, tingling, pressure sensation; denies weakness, dropping items; denies neck pain  Orthopedic history: NO  Relevant surgical history: NO  Social history: self employed as writer    Past Medical History:   Diagnosis Date     Family history of colon cancer     brother     Family history of colonic polyps     mom     Hypothyroidism      Shingles      Social History     Socioeconomic History     Marital status:    Tobacco Use     Smoking status: Never Smoker     Smokeless tobacco: Never Used   Substance and Sexual Activity     Alcohol use: No     Drug use: No     Sexual activity: Not Currently       Patient's past medical, surgical, social, and family histories were reviewed today and no changes are noted.    REVIEW OF SYSTEMS:  10 point ROS is negative other than symptoms noted above in HPI, Past Medical History or as stated below  Constitutional: NEGATIVE for fever, chills, change in weight  Skin: NEGATIVE for worrisome rashes, moles or lesions  GI/: NEGATIVE for bowel or bladder changes  Neuro: NEGATIVE for weakness, dizziness or paresthesias    OBJECTIVE:  /82 (BP Location: Right arm)   Ht 1.702 m (5' 7\")   Wt 102.1 kg (225 lb)   BMI 35.24 kg/m     General: healthy, alert and in no distress  HEENT: no scleral icterus or conjunctival erythema  Skin: no " suspicious lesions or rash. No jaundice.  CV: regular rhythm by palpation  Resp: normal respiratory effort without conversational dyspnea   Psych: normal mood and affect  Gait: normal steady gait with appropriate coordination and balance  Neuro: normal light touch sensory exam of the bilateral hands.    MSK:  BILATERAL HAND & WRIST  Inspection:    No swelling, bruising, discoloration, or obvious deformity or asymmetry  Palpation:    Tender about the carpal tunnel (R, mild). Remainder of bony and ligamentous line marks are nontender.    Crepitus is Absent    Metacarpals: normal    Thumb: normal    Fingers: normal  Range of Motion:    Wrist: Full (active and passive) flexion, extension, pronation/supination, and ulnar/radial deviation.    Hand: Full active flexion and extension at MCP, PIP, and DIP joints; normal finger cascade without malrotation.   Strength:    No deficits in wrist flexion, extension, ulnar/radial deviation.    No deficits in hand/finger extension, flexion, abduction, adduction, opposition  Special Tests:    Positive: Slightly provocative Phalen's and Tinel's at the right carpal tunnel, slightly provoking right Spurlings    Negative: Phalen's (left), Tinel's (left carpal tunnel), Tinel's (bilat cubital tunnel), thenar eminence wasting, hypothenar eminence wasting     Carolyn Larsen MD [facility]        Again, thank you for allowing me to participate in the care of your patient.        Sincerely,        Carolyn Larsen MD

## 2022-09-28 NOTE — PATIENT INSTRUCTIONS
1. Numbness and tingling in right hand    2. Carpal tunnel syndrome, right      Clare Wallace is a 66 year old right-handed female presenting for evaluation of bilateral hand numbness and tingling (R>L).  History and exam findings were reviewed today, consistent with carpel tunnel syndrome. Differential also includes possible right-sided cervical radiculopathy (pinched nerve in the neck).     We reviewed treatment options and plan to proceed with the following:  - An order has been faxed to [facility] in [place] for a nerve conduction test/EMG. Please call the number provided to schedule.   - A right cock-up wrist brace was provided to be worn consistently at night.   - Referral placed for formal hand therapy to work on gentle wrist flexion/extension and finger flexion/extension exercises with pinch/ strengthening exercises along with median nerve glides and use of custom orthotic/splinting/modalities as needed with home exercise program. Please evaluate/discuss posture, chair height and office biomechanics.    Please schedule a follow up appointment to see me in 4-6 weeks for follow up. You may call our direct clinic number ([phone]) at any time with questions or concerns.    Carolyn Larsen MD, [facility]

## 2022-10-06 ENCOUNTER — MYC REFILL (OUTPATIENT)
Dept: FAMILY MEDICINE | Facility: CLINIC | Age: 67
End: 2022-10-06

## 2022-10-06 DIAGNOSIS — I10 BENIGN ESSENTIAL HYPERTENSION: ICD-10-CM

## 2022-10-06 DIAGNOSIS — E03.9 HYPOTHYROIDISM, UNSPECIFIED TYPE: ICD-10-CM

## 2022-10-10 ENCOUNTER — MYC MEDICAL ADVICE (OUTPATIENT)
Dept: FAMILY MEDICINE | Facility: CLINIC | Age: 67
End: 2022-10-10

## 2022-10-10 RX ORDER — LEVOTHYROXINE SODIUM 125 UG/1
TABLET ORAL
Qty: 30 TABLET | Refills: 0 | Status: SHIPPED | OUTPATIENT
Start: 2022-10-10 | End: 2022-10-14

## 2022-10-10 RX ORDER — LEVOTHYROXINE SODIUM 125 UG/1
125 TABLET ORAL DAILY
Qty: 30 TABLET | Refills: 0 | OUTPATIENT
Start: 2022-10-10

## 2022-10-10 RX ORDER — HYDROCHLOROTHIAZIDE 12.5 MG/1
12.5 TABLET ORAL DAILY
Qty: 30 TABLET | Refills: 0 | OUTPATIENT
Start: 2022-10-10

## 2022-10-10 RX ORDER — HYDROCHLOROTHIAZIDE 12.5 MG/1
TABLET ORAL
Qty: 30 TABLET | Refills: 0 | Status: SHIPPED | OUTPATIENT
Start: 2022-10-10 | End: 2022-10-13

## 2022-10-10 NOTE — TELEPHONE ENCOUNTER
Medication is being filled for 1 time refill only due to:  Patient needs to be seen because it has been more than one year since last visit.     Tamara refill sent. Pt has appt scheduled already with Yari Ruiz MD on 10/13/22    Prescription approved per [facility] Refill Protocol.  Nery Romano RN

## 2022-10-10 NOTE — TELEPHONE ENCOUNTER
Responded to pt MyChart message advising pt that 30 tablets of levothyroxine sent to [facility] in [place] today.    Nery Romano RN

## 2022-10-13 ENCOUNTER — OFFICE VISIT (OUTPATIENT)
Dept: FAMILY MEDICINE | Facility: CLINIC | Age: 67
End: 2022-10-13
Payer: COMMERCIAL

## 2022-10-13 VITALS
OXYGEN SATURATION: 95 % | HEIGHT: 67 IN | HEART RATE: 76 BPM | BODY MASS INDEX: 35.28 KG/M2 | SYSTOLIC BLOOD PRESSURE: 116 MMHG | WEIGHT: 224.8 LBS | RESPIRATION RATE: 12 BRPM | DIASTOLIC BLOOD PRESSURE: 76 MMHG | TEMPERATURE: 97.9 F

## 2022-10-13 DIAGNOSIS — Z00.00 ROUTINE GENERAL MEDICAL EXAMINATION AT A HEALTH CARE FACILITY: Primary | ICD-10-CM

## 2022-10-13 DIAGNOSIS — Z13.21 ENCOUNTER FOR VITAMIN DEFICIENCY SCREENING: ICD-10-CM

## 2022-10-13 DIAGNOSIS — Z86.19 HISTORY OF SHINGLES: ICD-10-CM

## 2022-10-13 DIAGNOSIS — Z12.4 CERVICAL CANCER SCREENING: ICD-10-CM

## 2022-10-13 DIAGNOSIS — E03.9 HYPOTHYROIDISM, UNSPECIFIED TYPE: ICD-10-CM

## 2022-10-13 DIAGNOSIS — N84.1 POLYP AT CERVICAL OS: ICD-10-CM

## 2022-10-13 DIAGNOSIS — J30.2 SEASONAL ALLERGIC RHINITIS, UNSPECIFIED TRIGGER: ICD-10-CM

## 2022-10-13 DIAGNOSIS — I10 BENIGN ESSENTIAL HYPERTENSION: ICD-10-CM

## 2022-10-13 LAB
ALBUMIN SERPL-MCNC: 3.8 G/DL (ref 3.4–5)
ALP SERPL-CCNC: 126 U/L (ref 40–150)
ALT SERPL W P-5'-P-CCNC: 27 U/L (ref 0–50)
ANION GAP SERPL CALCULATED.3IONS-SCNC: 7 MMOL/L (ref 3–14)
AST SERPL W P-5'-P-CCNC: 15 U/L (ref 0–45)
BASOPHILS # BLD AUTO: 0 10E3/UL (ref 0–0.2)
BASOPHILS NFR BLD AUTO: 1 %
BILIRUB SERPL-MCNC: 0.4 MG/DL (ref 0.2–1.3)
BUN SERPL-MCNC: 14 MG/DL (ref 7–30)
CALCIUM SERPL-MCNC: 10.8 MG/DL (ref 8.5–10.1)
CHLORIDE BLD-SCNC: 106 MMOL/L (ref 94–109)
CHOLEST SERPL-MCNC: 262 MG/DL
CO2 SERPL-SCNC: 26 MMOL/L (ref 20–32)
CREAT SERPL-MCNC: 0.8 MG/DL (ref 0.52–1.04)
DEPRECATED CALCIDIOL+CALCIFEROL SERPL-MC: 14 UG/L (ref 20–75)
EOSINOPHIL # BLD AUTO: 0.1 10E3/UL (ref 0–0.7)
EOSINOPHIL NFR BLD AUTO: 2 %
ERYTHROCYTE [DISTWIDTH] IN BLOOD BY AUTOMATED COUNT: 12.3 % (ref 10–15)
FASTING STATUS PATIENT QL REPORTED: ABNORMAL
GFR SERPL CREATININE-BSD FRML MDRD: 82 ML/MIN/1.73M2
GLUCOSE BLD-MCNC: 84 MG/DL (ref 70–99)
HCT VFR BLD AUTO: 43.2 % (ref 35–47)
HDLC SERPL-MCNC: 63 MG/DL
HGB BLD-MCNC: 14.5 G/DL (ref 11.7–15.7)
LDLC SERPL CALC-MCNC: 171 MG/DL
LYMPHOCYTES # BLD AUTO: 2.5 10E3/UL (ref 0.8–5.3)
LYMPHOCYTES NFR BLD AUTO: 40 %
MCH RBC QN AUTO: 31.6 PG (ref 26.5–33)
MCHC RBC AUTO-ENTMCNC: 33.6 G/DL (ref 31.5–36.5)
MCV RBC AUTO: 94 FL (ref 78–100)
MONOCYTES # BLD AUTO: 0.5 10E3/UL (ref 0–1.3)
MONOCYTES NFR BLD AUTO: 9 %
NEUTROPHILS # BLD AUTO: 3 10E3/UL (ref 1.6–8.3)
NEUTROPHILS NFR BLD AUTO: 48 %
NONHDLC SERPL-MCNC: 199 MG/DL
PLATELET # BLD AUTO: 242 10E3/UL (ref 150–450)
POTASSIUM BLD-SCNC: 4.2 MMOL/L (ref 3.4–5.3)
PROT SERPL-MCNC: 7.4 G/DL (ref 6.8–8.8)
RBC # BLD AUTO: 4.59 10E6/UL (ref 3.8–5.2)
SODIUM SERPL-SCNC: 139 MMOL/L (ref 133–144)
T4 FREE SERPL-MCNC: 1.02 NG/DL (ref 0.76–1.46)
TRIGL SERPL-MCNC: 138 MG/DL
TSH SERPL DL<=0.005 MIU/L-ACNC: 4.84 MU/L (ref 0.4–4)
WBC # BLD AUTO: 6.2 10E3/UL (ref 4–11)

## 2022-10-13 PROCEDURE — 82306 VITAMIN D 25 HYDROXY: CPT | Performed by: FAMILY MEDICINE

## 2022-10-13 PROCEDURE — 36415 COLL VENOUS BLD VENIPUNCTURE: CPT | Performed by: FAMILY MEDICINE

## 2022-10-13 PROCEDURE — G0145 SCR C/V CYTO,THINLAYER,RESCR: HCPCS | Performed by: FAMILY MEDICINE

## 2022-10-13 PROCEDURE — 90682 RIV4 VACC RECOMBINANT DNA IM: CPT | Performed by: FAMILY MEDICINE

## 2022-10-13 PROCEDURE — 80061 LIPID PANEL: CPT | Performed by: FAMILY MEDICINE

## 2022-10-13 PROCEDURE — 84439 ASSAY OF FREE THYROXINE: CPT | Performed by: FAMILY MEDICINE

## 2022-10-13 PROCEDURE — 80050 GENERAL HEALTH PANEL: CPT | Performed by: FAMILY MEDICINE

## 2022-10-13 PROCEDURE — 90471 IMMUNIZATION ADMIN: CPT | Performed by: FAMILY MEDICINE

## 2022-10-13 PROCEDURE — 99396 PREV VISIT EST AGE 40-64: CPT | Mod: 25 | Performed by: FAMILY MEDICINE

## 2022-10-13 PROCEDURE — 87624 HPV HI-RISK TYP POOLED RSLT: CPT | Performed by: FAMILY MEDICINE

## 2022-10-13 RX ORDER — VALACYCLOVIR HYDROCHLORIDE 1 G/1
1000 TABLET, FILM COATED ORAL 2 TIMES DAILY
Qty: 180 TABLET | Refills: 3 | Status: SHIPPED | OUTPATIENT
Start: 2022-10-13 | End: 2023-11-03

## 2022-10-13 RX ORDER — LORATADINE 10 MG/1
10 TABLET ORAL DAILY
Qty: 90 TABLET | Refills: 3 | Status: SHIPPED | OUTPATIENT
Start: 2022-10-13 | End: 2023-11-03

## 2022-10-13 RX ORDER — HYDROCHLOROTHIAZIDE 12.5 MG/1
12.5 TABLET ORAL DAILY
Qty: 90 TABLET | Refills: 3 | Status: SHIPPED | OUTPATIENT
Start: 2022-10-13 | End: 2023-09-08

## 2022-10-13 NOTE — PROGRESS NOTES
SUBJECTIVE:   CC: Clare is an 66 year old who presents for preventive health visit.       Patient has been advised of split billing requirements and indicates understanding: Yes  Healthy Habits:     Getting at least 3 servings of Calcium per day:  NO    Bi-annual eye exam:  NO    Dental care twice a year:  Yes    Sleep apnea or symptoms of sleep apnea:  None    Diet:  Regular (no restrictions)    Frequency of exercise:  2-3 days/week    Duration of exercise:  30-45 minutes    Taking medications regularly:  Yes    Barriers to taking medications:  None    Medication side effects:  None    PHQ-2 Total Score: 0    Additional concerns today:  No      Today's PHQ-2 Score:   PHQ-2 ( 1999 Pf[employer]) 10/12/2022   Q1: Little interest or pleasure in doing things 0   Q2: Feeling down, depressed or hopeless 0   PHQ-2 Score 0   PHQ-2 Total Score (12-17 Years)- Positive if 3 or more points; Administer PHQ-A if positive -   Q1: Little interest or pleasure in doing things Not at all   Q2: Feeling down, depressed or hopeless Not at all   PHQ-2 Score 0       Abuse: Current or Past (Physical, Sexual or Emotional) - No  Do you feel safe in your environment? Yes        Social History     Tobacco Use     Smoking status: Never     Smokeless tobacco: Never   Substance Use Topics     Alcohol use: No         Alcohol Use 5/4/2021   Prescreen: >3 drinks/day or >7 drinks/week? No   Prescreen: >3 drinks/day or >7 drinks/week? -       Reviewed orders with patient.  Reviewed health maintenance and updated orders accordingly - Yes  Labs reviewed in EPIC    Breast Cancer Screening:    FHS-7: No flowsheet data found.  click delete button to remove this line now  Mammogram Screening: Recommended mammography every 1-2 years with patient discussion and risk factor consideration  Pertinent mammograms are reviewed under the imaging tab.    History of abnormal Pap smear: NO - age 30- 65 PAP every 3 years recommended     Reviewed and updated as needed this  "visit by clinical staff   Tobacco  Allergies  Meds   Med Hx  Surg Hx  Fam Hx  Soc Hx        Reviewed and updated as needed this visit by Provider                     Review of Systems  CONSTITUTIONAL: NEGATIVE for fever, chills, change in weight  INTEGUMENTARY/SKIN: NEGATIVE for worrisome rashes, moles or lesions  EYES: NEGATIVE for vision changes or irritation  ENT: NEGATIVE for ear, mouth and throat problems  RESP: NEGATIVE for significant cough or SOB  BREAST: NEGATIVE for masses, tenderness or discharge  CV: NEGATIVE for chest pain, palpitations or peripheral edema  GI: NEGATIVE for nausea, abdominal pain, heartburn, or change in bowel habits  : NEGATIVE for unusual urinary or vaginal symptoms. No vaginal bleeding.  MUSCULOSKELETAL: NEGATIVE for significant arthralgias or myalgia  NEURO: NEGATIVE for weakness, dizziness or paresthesias  PSYCHIATRIC: NEGATIVE for changes in mood or affect      OBJECTIVE:   Pulse 76   Temp 97.9  F (36.6  C) (Oral)   Resp 12   Ht 1.702 m (5' 7\")   Wt 102 kg (224 lb 12.8 oz)   SpO2 95%   BMI 35.21 kg/m    Physical Exam  GENERAL APPEARANCE: healthy, alert and no distress  EYES: Eyes grossly normal to inspection, PERRL and conjunctivae and sclerae normal  HENT: ear canals and TM's normal, nose and mouth without ulcers or lesions, oropharynx clear and oral mucous membranes moist  NECK: no adenopathy, no asymmetry, masses, or scars and thyroid normal to palpation  RESP: lungs clear to auscultation - no rales, rhonchi or wheezes  BREAST: normal without masses, tenderness or nipple discharge and no palpable axillary masses or adenopathy  CV: regular rate and rhythm, normal S1 S2, no S3 or S4, no murmur, click or rub, no peripheral edema and peripheral pulses strong  ABDOMEN: soft, nontender, no hepatosplenomegaly, no masses and bowel sounds normal   (female): normal female external genitalia, normal urethral meatus, vaginal mucosal atrophy noted, normal cervix, adnexae, " "and uterus without masses. and cervical polyp at cervical os   MS: no musculoskeletal defects are noted and gait is age appropriate without ataxia  SKIN: no suspicious lesions or rashes  NEURO: Normal strength and tone, sensory exam grossly normal, mentation intact and speech normal  PSYCH: mentation appears normal and affect normal/bright    Diagnostic Test Results:  Labs reviewed in Epic  none     ASSESSMENT/PLAN:   (Z00.00) Routine general medical examination at a health care facility  (primary encounter diagnosis)  Plan: Comprehensive metabolic panel (BMP + Alb, Alk         Phos, ALT, AST, Total. Bili, TP), CBC with         platelets and differential, Lipid panel reflex         to direct LDL Fasting    (Z12.4) Cervical cancer screening  Plan: Pap Screen with HPV - recommended age 30 - 65         years, HPV Hold (Lab Only), HPV High Risk Types        DNA Cervical      (E03.9) Hypothyroidism, unspecified type  Plan: TSH with free T4 reflex, T4 free      (I10) Benign essential hypertension  Comment: stable   Plan: hydrochlorothiazide (HYDRODIURIL) 12.5 MG         tablet      (Z86.19) History of shingles  Plan: valACYclovir (VALTREX) 1000 mg tablet      (Z13.21) Encounter for vitamin deficiency screening  Plan: Vitamin D Deficiency      (J30.2) Seasonal allergic rhinitis, unspecified trigger  Plan: loratadine (CLARITIN) 10 MG tablet      (N84.1) Polyp at cervical os  Comment: noted during exam. Currently asymptomatic       Patient has been advised of split billing requirements and indicates understanding: Yes    COUNSELING:  Reviewed preventive health counseling, as reflected in patient instructions       Regular exercise       Healthy diet/nutrition    Estimated body mass index is 35.21 kg/m  as calculated from the following:    Height as of this encounter: 1.702 m (5' 7\").    Weight as of this encounter: 102 kg (224 lb 12.8 oz).    Weight management plan: Discussed healthy diet and exercise guidelines    She " reports that she has never smoked. She has never used smokeless tobacco.      Counseling Resources:  ATP IV Guidelines  Pooled Cohorts Equation Calculator  Breast Cancer Risk Calculator  BRCA-Related Cancer Risk Assessment: FHS-7 Tool  FRAX Risk Assessment  ICSI Preventive Guidelines  Dietary Guidelines for Americans, 2010  USDA's MyPlate  ASA Prophylaxis  Lung CA Screening    Yari Ruiz MD  [facility]

## 2022-10-14 DIAGNOSIS — E03.9 HYPOTHYROIDISM, UNSPECIFIED TYPE: ICD-10-CM

## 2022-10-14 DIAGNOSIS — E55.9 VITAMIN D DEFICIENCY: Primary | ICD-10-CM

## 2022-10-14 RX ORDER — ERGOCALCIFEROL 1.25 MG/1
50000 CAPSULE, LIQUID FILLED ORAL WEEKLY
Qty: 12 CAPSULE | Refills: 0 | Status: SHIPPED | OUTPATIENT
Start: 2022-10-14 | End: 2023-11-03

## 2022-10-14 RX ORDER — LEVOTHYROXINE SODIUM 125 UG/1
125 TABLET ORAL DAILY
Qty: 90 TABLET | Refills: 3 | Status: SHIPPED | OUTPATIENT
Start: 2022-10-14 | End: 2022-10-31

## 2022-10-17 LAB
BKR LAB AP GYN ADEQUACY: NORMAL
BKR LAB AP GYN INTERPRETATION: NORMAL
BKR LAB AP HPV REFLEX: NORMAL
BKR LAB AP PREVIOUS ABNORMAL: NORMAL
PATH REPORT.COMMENTS IMP SPEC: NORMAL
PATH REPORT.COMMENTS IMP SPEC: NORMAL
PATH REPORT.RELEVANT HX SPEC: NORMAL

## 2022-10-19 LAB
HUMAN PAPILLOMA VIRUS 16 DNA: NEGATIVE
HUMAN PAPILLOMA VIRUS 18 DNA: NEGATIVE
HUMAN PAPILLOMA VIRUS FINAL DIAGNOSIS: NORMAL
HUMAN PAPILLOMA VIRUS OTHER HR: NEGATIVE

## 2022-10-31 ENCOUNTER — MYC MEDICAL ADVICE (OUTPATIENT)
Dept: FAMILY MEDICINE | Facility: CLINIC | Age: 67
End: 2022-10-31

## 2022-10-31 DIAGNOSIS — E03.9 HYPOTHYROIDISM, UNSPECIFIED TYPE: ICD-10-CM

## 2022-10-31 RX ORDER — LEVOTHYROXINE SODIUM 125 UG/1
125 TABLET ORAL DAILY
Qty: 90 TABLET | Refills: 3 | Status: SHIPPED | OUTPATIENT
Start: 2022-10-31 | End: 2023-09-08

## 2022-10-31 NOTE — TELEPHONE ENCOUNTER
Levothyroxine transferred to patient's preferred pharmacy. [id number] [id number] [address] BRIAN Jin RN

## 2022-11-14 DIAGNOSIS — Z86.19 HISTORY OF SHINGLES: ICD-10-CM

## 2022-11-15 RX ORDER — VALACYCLOVIR HYDROCHLORIDE 1 G/1
TABLET, FILM COATED ORAL
Qty: 30 TABLET | Refills: 23 | OUTPATIENT
Start: 2022-11-15

## 2022-12-13 ENCOUNTER — NURSE TRIAGE (OUTPATIENT)
Dept: FAMILY MEDICINE | Facility: CLINIC | Age: 67
End: 2022-12-13

## 2022-12-13 ENCOUNTER — E-VISIT (OUTPATIENT)
Dept: FAMILY MEDICINE | Facility: CLINIC | Age: 67
End: 2022-12-13
Payer: COMMERCIAL

## 2022-12-13 ENCOUNTER — OFFICE VISIT (OUTPATIENT)
Dept: URGENT CARE | Facility: URGENT CARE | Age: 67
End: 2022-12-13
Payer: COMMERCIAL

## 2022-12-13 VITALS
OXYGEN SATURATION: 99 % | TEMPERATURE: 98.1 F | DIASTOLIC BLOOD PRESSURE: 72 MMHG | RESPIRATION RATE: 16 BRPM | HEART RATE: 76 BPM | SYSTOLIC BLOOD PRESSURE: 110 MMHG

## 2022-12-13 DIAGNOSIS — R07.0 THROAT PAIN: ICD-10-CM

## 2022-12-13 DIAGNOSIS — J02.9 VIRAL PHARYNGITIS: Primary | ICD-10-CM

## 2022-12-13 DIAGNOSIS — J02.9 ACUTE PHARYNGITIS, UNSPECIFIED ETIOLOGY: Primary | ICD-10-CM

## 2022-12-13 LAB — DEPRECATED S PYO AG THROAT QL EIA: NEGATIVE

## 2022-12-13 PROCEDURE — 99213 OFFICE O/P EST LOW 20 MIN: CPT | Performed by: PHYSICIAN ASSISTANT

## 2022-12-13 PROCEDURE — 99421 OL DIG E/M SVC 5-10 MIN: CPT | Performed by: FAMILY MEDICINE

## 2022-12-13 PROCEDURE — 87651 STREP A DNA AMP PROBE: CPT | Performed by: PHYSICIAN ASSISTANT

## 2022-12-13 NOTE — TELEPHONE ENCOUNTER
Nurse Triage SBAR    Is this a 2nd Level Triage? YES, LICENSED PRACTITIONER REVIEW IS REQUIRED    Situation: new onset sore throat    Background: Pt started developing sore throat yesterday morning. Pt submitted e-visit to Yari Ruiz MD today    Assessment: Sore throat starting yesterday morning. Pt informs of moderate pain and pain with eating solid foods. Pt has been drinking lots of fluids and soup.      Denies cough, fever, chest pain, difficulty breathing, headache, rash    Protocol Recommended Disposition:   No disposition on file.    Recommendation: Reviewed care advice under care tab with pt. Advised pt drink cold liquids and gargle with salt water  Huddled with Cooper Etienne PA-C who recommends pt try chloraseptic spray, cold liquids and wait for provider response to e-visit.    Patient was given an opportunity to ask questions, verbalized understanding of plan, and is agreeable.    Nery Romano RN      Routed to provider    Does the patient meet one of the following criteria for ADS visit consideration? 16+ years old, with an [facility] PCP     TIP  Providers, please consider if this condition is appropriate for management at one of our Acute and Diagnostic Services sites.     If patient is a good candidate, please use dotphrase <dot>triageresponse and select Refer to ADS to document.      Reason for Disposition    Pus on tonsils (back of throat) and swollen neck lymph nodes ('glands')    Additional Information    Negative: SEVERE difficulty breathing (e.g., struggling for each breath, speaks in single words)    Negative: Sounds like a life-threatening emergency to the triager    Negative: Throat culture results, call about    Negative: Productive cough is main symptom    Negative: Runny nose is main symptom    Negative: Drooling or spitting out saliva (because can't swallow)    Negative: Unable to open mouth completely    Negative: Drinking very little and has signs of dehydration (e.g., no urine >  "12 hours, very dry mouth, very lightheaded)    Negative: Patient sounds very sick or weak to the triager    Negative: Difficulty breathing (per caller) but not severe    Negative: Fever > 103 F (39.4 C)    Negative: Refuses to drink anything for > 12 hours    Negative: SEVERE sore throat pain    Answer Assessment - Initial Assessment Questions  1. ONSET: \"When did the throat start hurting?\" (Hours or days ago)       yesterday morning   2. SEVERITY: \"How bad is the sore throat?\" (Scale 1-10; mild, moderate or severe)    - MILD (1-3):  doesn't interfere with eating or normal activities    - MODERATE (4-7): interferes with eating some solids and normal activities    - SEVERE (8-10):  excruciating pain, interferes with most normal activities    - SEVERE DYSPHAGIA: can't swallow liquids, drooling      moderate  3. STREP EXPOSURE: \"Has there been any exposure to strep within the past week?\" If Yes, ask: \"What type of contact occurred?\"       unknown  4.  VIRAL SYMPTOMS: \"Are there any symptoms of a cold, such as a runny nose, cough, hoarse voice or red eyes?\"       Nasal congestion (not running)  Denies cough, fever, chest ;apin, difficulty breathing  5. FEVER: \"Do you have a fever?\" If Yes, ask: \"What is your temperature, how was it measured, and when did it start?\"      no  6. PUS ON THE TONSILS: \"Is there pus on the tonsils in the back of your throat?\"      Yes- white mass on tonsil  7. OTHER SYMPTOMS: \"Do you have any other symptoms?\" (e.g., difficulty breathing, headache, rash)      Denies cough, fever, chest ;apin, difficulty breathing, headache, rash  8. PREGNANCY: \"Is there any chance you are pregnant?\" \"When was your last menstrual period?\"      no    Protocols used: SORE THROAT-A-OH      "

## 2022-12-13 NOTE — PROGRESS NOTES
Chief Complaint   Patient presents with     Pharyngitis     For 36 hours     Sinus Problem     Pressure       ASSESSMENT/PLAN:  Clare was seen today for pharyngitis and sinus problem.    Diagnoses and all orders for this visit:    Viral pharyngitis    Throat pain  -     Streptococcus A Rapid Screen w/Reflex to PCR - Clinic Collect  -     Group A Streptococcus PCR Throat Swab    Rapid strep negative, COVID test negative at home.  Appears well overall, hemodynamically stable.  Reassuring exam.  Supportive care    Frankie Garcia PA-C      SUBJECTIVE:  Clare is a 66 year old female who presents to urgent care with 2 days of sore throat.  Minimal nasal congestion.  Took 2 COVID test at home yesterday and today both were negative.  Feels very fatigued and chilled.    ROS: Pertinent ROS neg other than the symptoms noted above in the HPI.     OBJECTIVE:  /72 (Cuff Size: Adult Large)   Pulse 76   Temp 98.1  F (36.7  C) (Oral)   Resp 16   SpO2 99%    GENERAL: healthy, alert and no distress  EYES: Eyes grossly normal to inspection, PERRL and conjunctivae and sclerae normal  HENT: ear canals and TM's normal, nose and mouth without ulcers or lesions, tonsils 1+ bilaterally and erythematous  NECK: Bilateral cervical adenopathy, nontender    DIAGNOSTICS    Results for orders placed or performed in visit on 12/13/22   Streptococcus A Rapid Screen w/Reflex to PCR - Clinic Collect     Status: Normal    Specimen: Throat; Swab   Result Value Ref Range    Group A Strep antigen Negative Negative        Current Outpatient Medications   Medication     hydrochlorothiazide (HYDRODIURIL) 12.5 MG tablet     levothyroxine (SYNTHROID/LEVOTHROID) 125 MCG tablet     loratadine (CLARITIN) 10 MG tablet     valACYclovir (VALTREX) 1000 mg tablet     vitamin D2 (ERGOCALCIFEROL) 65259 units (1250 mcg) capsule     No current facility-administered medications for this visit.      Patient Active Problem List   Diagnosis     Choroidal nevus  of right eye     Genital herpes     Headache     Heart murmur     Hypothyroidism     Menopausal state     Migraine     Myopia of right eye     Presbyopia     Regular astigmatism of left eye     Tinnitus     Vegetarian diet      Past Medical History:   Diagnosis Date     Family history of colon cancer     brother     Family history of colonic polyps     mom     Hypothyroidism      Shingles      Past Surgical History:   Procedure Laterality Date     CHOLECYSTECTOMY      lap     COLONOSCOPY Left 6/21/2021    Procedure: COLONOSCOPY, WITH POLYPECTOMY AND BIOPSY; polypectomies using cold bx forcep and hot snare;  Surgeon: Germán Garcia MD;  Location:  GI     Family History   Problem Relation Age of Onset     Breast Cancer Mother      Hypereosinophilic syndrome Father 47     Colorectal Cancer Brother 66     Social History     Tobacco Use     Smoking status: Never     Smokeless tobacco: Never   Substance Use Topics     Alcohol use: No              The plan of care was discussed with the patient. They understand and agree with the course of treatment prescribed. A printed summary was given including instructions and medications.  The use of Dragon/eROI dictation services may have been used to construct the content in this note; any grammatical or spelling errors are non-intentional. Please contact the author of this note directly if you are in need of any clarification.

## 2022-12-13 NOTE — PATIENT INSTRUCTIONS
Thank you for choosing us for your care. Given your symptoms, I would like you to do a lab-only visit to determine what is causing them.  I have placed the orders.  Please schedule an appointment with the lab right here in [place], or call [phone].  I will let you know when the results are back and next steps to take.

## 2022-12-13 NOTE — TELEPHONE ENCOUNTER
Provider E-Visit time total (minutes): 5 minutes    Yari Chua MD, [facility]

## 2022-12-14 LAB — GROUP A STREP BY PCR: NOT DETECTED

## 2022-12-16 ENCOUNTER — TELEPHONE (OUTPATIENT)
Dept: URGENT CARE | Facility: URGENT CARE | Age: 67
End: 2022-12-16

## 2022-12-21 ENCOUNTER — TELEPHONE (OUTPATIENT)
Dept: ORTHOPEDICS | Facility: CLINIC | Age: 67
End: 2022-12-21

## 2022-12-21 NOTE — TELEPHONE ENCOUNTER
Letter received from [facility] informing Dr. Solorzano that there have been multiple attempts to reach and schedule patient for requested EMG with no response.     Patient will need to contact [facility] to pursue requested study going forward.     Becca Trammell, ATC

## 2023-01-09 ENCOUNTER — MYC MEDICAL ADVICE (OUTPATIENT)
Dept: FAMILY MEDICINE | Facility: CLINIC | Age: 68
End: 2023-01-09

## 2023-04-24 ENCOUNTER — OFFICE VISIT (OUTPATIENT)
Dept: OPTOMETRY | Facility: CLINIC | Age: 68
End: 2023-04-24
Payer: COMMERCIAL

## 2023-04-24 DIAGNOSIS — H52.11 MYOPIA OF RIGHT EYE: ICD-10-CM

## 2023-04-24 DIAGNOSIS — H52.4 PRESBYOPIA: Primary | ICD-10-CM

## 2023-04-24 DIAGNOSIS — H52.222 REGULAR ASTIGMATISM OF LEFT EYE: ICD-10-CM

## 2023-04-24 PROCEDURE — 92015 DETERMINE REFRACTIVE STATE: CPT | Performed by: OPTOMETRIST

## 2023-04-24 PROCEDURE — 92004 COMPRE OPH EXAM NEW PT 1/>: CPT | Performed by: OPTOMETRIST

## 2023-04-24 ASSESSMENT — KERATOMETRY
OD_AXISANGLE_DEGREES: 71
OD_K1POWER_DIOPTERS: 46
OS_K1POWER_DIOPTERS: 45.39
OD_K2POWER_DIOPTERS: 46.37
OS_AXISANGLE2_DEGREES: 159
OS_AXISANGLE_DEGREES: 69
OD_AXISANGLE2_DEGREES: 161
OS_K2POWER_DIOPTERS: 46.75

## 2023-04-24 ASSESSMENT — REFRACTION_MANIFEST
OD_CYLINDER: +1.50
OS_AXIS: 038
OD_SPHERE: -3.00
METHOD_AUTOREFRACTION: 1
OD_CYLINDER: SPHERE
OS_SPHERE: -0.25
OS_CYLINDER: +0.50
OD_ADD: +2.50
OS_CYLINDER: +0.50
OD_AXIS: 179
OS_SPHERE: -1.75
OS_ADD: +2.50
OD_SPHERE: -0.25
OS_AXIS: 019

## 2023-04-24 ASSESSMENT — VISUAL ACUITY
OS_SC: 20/60
OD_SC: 20/20
METHOD: SNELLEN - LINEAR
OS_SC+: -1
OS_SC: 20/20
OD_SC: 20/40

## 2023-04-24 ASSESSMENT — CONF VISUAL FIELD
OS_INFERIOR_TEMPORAL_RESTRICTION: 0
OD_SUPERIOR_TEMPORAL_RESTRICTION: 0
OS_NORMAL: 1
OD_INFERIOR_TEMPORAL_RESTRICTION: 0
OS_INFERIOR_NASAL_RESTRICTION: 0
METHOD: COUNTING FINGERS
OD_NORMAL: 1
OS_SUPERIOR_NASAL_RESTRICTION: 0
OS_SUPERIOR_TEMPORAL_RESTRICTION: 0
OD_INFERIOR_NASAL_RESTRICTION: 0
OD_SUPERIOR_NASAL_RESTRICTION: 0

## 2023-04-24 ASSESSMENT — CUP TO DISC RATIO
OS_RATIO: 0.2
OD_RATIO: 0.2

## 2023-04-24 ASSESSMENT — EXTERNAL EXAM - RIGHT EYE: OD_EXAM: NORMAL

## 2023-04-24 ASSESSMENT — TONOMETRY
IOP_METHOD: APPLANATION
OD_IOP_MMHG: 17
OS_IOP_MMHG: 17

## 2023-04-24 ASSESSMENT — SLIT LAMP EXAM - LIDS
COMMENTS: NORMAL
COMMENTS: NORMAL

## 2023-04-24 ASSESSMENT — EXTERNAL EXAM - LEFT EYE: OS_EXAM: NORMAL

## 2023-04-24 NOTE — PROGRESS NOTES
Chief Complaint   Patient presents with     Annual Eye Exam        Last Eye Exam: 2018  Dilated Previously: Yes, side effects of dilation explained today    What are you currently using to see?  Glasses regular PAL and computer        Distance Vision Acuity:   Noticed gradual change in both eyes     Near Vision Acuity: Not satisfied in glasses     Eye Comfort: good  Do you use eye drops? 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     History of nevus R eye superior     OBJECTIVE: See Ophthalmology exam    ASSESSMENT:    ICD-10-CM    1. Presbyopia  H52.4 REFRACTION     EYE EXAM (SIMPLE-NONBILLABLE)      2. Myopia of right eye  H52.11       3. Regular astigmatism of left eye  H52.222         Unable to visualize nevus R from past notes  PLAN:   Single vision computer , as add is only 0.25 off from reading    Belkis Jin OD

## 2023-04-24 NOTE — LETTER
4/24/2023         RE: Clare Wallace  [id number]  [facility] [address]        Dear Colleague,    Thank you for referring your patient, Clare Wallace, to the [facility]. Please see a copy of my visit note below.    Chief Complaint   Patient presents with     Annual Eye Exam        Last Eye Exam: 2018  Dilated Previously: Yes, side effects of dilation explained today    What are you currently using to see?  Glasses regular PAL and computer        Distance Vision Acuity:   Noticed gradual change in both eyes     Near Vision Acuity: Not satisfied in glasses     Eye Comfort: good  Do you use eye drops? 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     History of nevus R eye superior     OBJECTIVE: See Ophthalmology exam    ASSESSMENT:    ICD-10-CM    1. Presbyopia  H52.4 REFRACTION     EYE EXAM (SIMPLE-NONBILLABLE)      2. Myopia of right eye  H52.11       3. Regular astigmatism of left eye  H52.222         Unable to visualize nevus R from past notes  PLAN:   Single vision computer , as add is only 0.25 off from reading    Belkis Jin OD         Again, thank you for allowing me to participate in the care of your patient.        Sincerely,        Belkis Jin, OD

## 2023-07-30 ENCOUNTER — HEALTH MAINTENANCE LETTER (OUTPATIENT)
Age: 68
End: 2023-07-30

## 2023-09-07 DIAGNOSIS — E03.9 HYPOTHYROIDISM, UNSPECIFIED TYPE: ICD-10-CM

## 2023-09-08 DIAGNOSIS — I10 BENIGN ESSENTIAL HYPERTENSION: ICD-10-CM

## 2023-09-08 RX ORDER — HYDROCHLOROTHIAZIDE 12.5 MG/1
12.5 TABLET ORAL DAILY
Qty: 90 TABLET | Refills: 0 | Status: SHIPPED | OUTPATIENT
Start: 2023-09-08 | End: 2023-09-11

## 2023-09-08 RX ORDER — LEVOTHYROXINE SODIUM 125 UG/1
125 TABLET ORAL DAILY
Qty: 90 TABLET | Refills: 0 | Status: SHIPPED | OUTPATIENT
Start: 2023-09-08 | End: 2023-12-04

## 2023-09-08 NOTE — TELEPHONE ENCOUNTER
Routing refill request to provider for review/approval because:  Patient needs to be seen because it has been more than 1 year since last office visit.    Appointments in Next Year      Nov 03, 2023  2:00 PM  (Arrive by 1:40 PM)  Adult Preventative Visit with April Maryan Chavez MD  [facility] ([facility] ) [phone]          Maris Juan RN on 9/8/2023 at 2:24 PM

## 2023-09-08 NOTE — TELEPHONE ENCOUNTER
Routing refill request to provider for review/approval because:  Labs not current:    TSH   Date Value Ref Range Status   10/13/2022 4.84 (H) 0.40 - 4.00 mU/L Final   05/05/2021 2.54 0.40 - 4.00 mU/L Final     Patient needs to be seen because it has been more than 1 year since last office visit.    Appointments in Next Year      Nov 03, 2023  2:00 PM  (Arrive by 1:40 PM)  Adult Preventative Visit with April Maryan Chavez MD  [facility] ([facility] ) [phone]          Maris Juan RN on 9/8/2023 at 2:24 PM

## 2023-09-09 DIAGNOSIS — I10 BENIGN ESSENTIAL HYPERTENSION: ICD-10-CM

## 2023-09-11 RX ORDER — HYDROCHLOROTHIAZIDE 12.5 MG/1
12.5 TABLET ORAL DAILY
Qty: 90 TABLET | Refills: 0 | Status: SHIPPED | OUTPATIENT
Start: 2023-09-11 | End: 2023-10-03

## 2023-10-02 DIAGNOSIS — I10 BENIGN ESSENTIAL HYPERTENSION: ICD-10-CM

## 2023-10-03 RX ORDER — HYDROCHLOROTHIAZIDE 12.5 MG/1
12.5 TABLET ORAL DAILY
Qty: 90 TABLET | Refills: 0 | Status: SHIPPED | OUTPATIENT
Start: 2023-10-03 | End: 2023-12-27

## 2023-10-18 ENCOUNTER — PATIENT OUTREACH (OUTPATIENT)
Dept: GASTROENTEROLOGY | Facility: CLINIC | Age: 68
End: 2023-10-18

## 2023-11-02 ENCOUNTER — MYC MEDICAL ADVICE (OUTPATIENT)
Dept: FAMILY MEDICINE | Facility: CLINIC | Age: 68
End: 2023-11-02
Payer: COMMERCIAL

## 2023-11-02 SDOH — HEALTH STABILITY: PHYSICAL HEALTH: ON AVERAGE, HOW MANY MINUTES DO YOU ENGAGE IN EXERCISE AT THIS LEVEL?: 30 MIN

## 2023-11-02 SDOH — HEALTH STABILITY: PHYSICAL HEALTH: ON AVERAGE, HOW MANY DAYS PER WEEK DO YOU ENGAGE IN MODERATE TO STRENUOUS EXERCISE (LIKE A BRISK WALK)?: 6 DAYS

## 2023-11-02 ASSESSMENT — SOCIAL DETERMINANTS OF HEALTH (SDOH)
HOW OFTEN DO YOU ATTEND CHURCH OR RELIGIOUS SERVICES?: MORE THAN 4 TIMES PER YEAR
IN A TYPICAL WEEK, HOW MANY TIMES DO YOU TALK ON THE PHONE WITH FAMILY, FRIENDS, OR NEIGHBORS?: ONCE A WEEK
DO YOU BELONG TO ANY CLUBS OR ORGANIZATIONS SUCH AS CHURCH GROUPS UNIONS, FRATERNAL OR ATHLETIC GROUPS, OR SCHOOL GROUPS?: YES
HOW OFTEN DO YOU ATTENT MEETINGS OF THE CLUB OR ORGANIZATION YOU BELONG TO?: MORE THAN 4 TIMES PER YEAR
HOW OFTEN DO YOU GET TOGETHER WITH FRIENDS OR RELATIVES?: PATIENT DECLINED

## 2023-11-02 ASSESSMENT — ENCOUNTER SYMPTOMS
DIZZINESS: 0
HEMATOCHEZIA: 0
FREQUENCY: 0
WEAKNESS: 0
JOINT SWELLING: 0
ABDOMINAL PAIN: 0
ARTHRALGIAS: 0
PALPITATIONS: 0
SORE THROAT: 0
DYSURIA: 0
CONSTIPATION: 0
EYE PAIN: 0
DIARRHEA: 0
NAUSEA: 0
NERVOUS/ANXIOUS: 0
HEARTBURN: 0
HEMATURIA: 0
COUGH: 0
BREAST MASS: 0
HEADACHES: 0
MYALGIAS: 0
PARESTHESIAS: 0
FEVER: 0
SHORTNESS OF BREATH: 0
CHILLS: 0

## 2023-11-02 ASSESSMENT — ACTIVITIES OF DAILY LIVING (ADL): CURRENT_FUNCTION: NO ASSISTANCE NEEDED

## 2023-11-02 ASSESSMENT — LIFESTYLE VARIABLES
AUDIT-C TOTAL SCORE: 1
HOW MANY STANDARD DRINKS CONTAINING ALCOHOL DO YOU HAVE ON A TYPICAL DAY: 1 OR 2
SKIP TO QUESTIONS 9-10: 1
HOW OFTEN DO YOU HAVE SIX OR MORE DRINKS ON ONE OCCASION: NEVER
HOW OFTEN DO YOU HAVE A DRINK CONTAINING ALCOHOL: MONTHLY OR LESS

## 2023-11-03 ENCOUNTER — OFFICE VISIT (OUTPATIENT)
Dept: FAMILY MEDICINE | Facility: CLINIC | Age: 68
End: 2023-11-03
Payer: COMMERCIAL

## 2023-11-03 VITALS
SYSTOLIC BLOOD PRESSURE: 135 MMHG | DIASTOLIC BLOOD PRESSURE: 80 MMHG | RESPIRATION RATE: 12 BRPM | HEART RATE: 64 BPM | OXYGEN SATURATION: 96 % | WEIGHT: 220.6 LBS | BODY MASS INDEX: 34.62 KG/M2 | TEMPERATURE: 98.2 F | HEIGHT: 67 IN

## 2023-11-03 DIAGNOSIS — E03.9 HYPOTHYROIDISM, UNSPECIFIED TYPE: ICD-10-CM

## 2023-11-03 DIAGNOSIS — Z78.0 POST-MENOPAUSAL: ICD-10-CM

## 2023-11-03 DIAGNOSIS — G56.03 BILATERAL CARPAL TUNNEL SYNDROME: ICD-10-CM

## 2023-11-03 DIAGNOSIS — Z12.31 VISIT FOR SCREENING MAMMOGRAM: ICD-10-CM

## 2023-11-03 DIAGNOSIS — Z00.00 ENCOUNTER FOR MEDICARE ANNUAL WELLNESS EXAM: Primary | ICD-10-CM

## 2023-11-03 DIAGNOSIS — Z86.19 HISTORY OF SHINGLES: ICD-10-CM

## 2023-11-03 DIAGNOSIS — Z23 NEED FOR PROPHYLACTIC VACCINATION AND INOCULATION AGAINST INFLUENZA: ICD-10-CM

## 2023-11-03 PROBLEM — G56.00 CTS (CARPAL TUNNEL SYNDROME): Status: ACTIVE | Noted: 2022-02-01

## 2023-11-03 PROBLEM — B02.9 SHINGLES: Status: ACTIVE | Noted: 2023-11-03

## 2023-11-03 PROBLEM — Z78.9 VEGETARIAN DIET: Status: RESOLVED | Noted: 2021-05-04 | Resolved: 2023-11-03

## 2023-11-03 PROCEDURE — 90662 IIV NO PRSV INCREASED AG IM: CPT | Performed by: FAMILY MEDICINE

## 2023-11-03 PROCEDURE — G0008 ADMIN INFLUENZA VIRUS VAC: HCPCS | Performed by: FAMILY MEDICINE

## 2023-11-03 PROCEDURE — G0402 INITIAL PREVENTIVE EXAM: HCPCS | Performed by: FAMILY MEDICINE

## 2023-11-03 RX ORDER — VALACYCLOVIR HYDROCHLORIDE 1 G/1
1000 TABLET, FILM COATED ORAL 2 TIMES DAILY
Qty: 180 TABLET | Refills: 3 | Status: SHIPPED | OUTPATIENT
Start: 2023-11-03

## 2023-11-03 RX ORDER — RESPIRATORY SYNCYTIAL VIRUS VACCINE 120MCG/0.5
0.5 KIT INTRAMUSCULAR ONCE
Qty: 1 EACH | Refills: 0 | Status: CANCELLED | OUTPATIENT
Start: 2023-11-03 | End: 2023-11-03

## 2023-11-03 RX ORDER — LORATADINE 10 MG/1
10 TABLET ORAL DAILY
COMMUNITY
End: 2023-12-06

## 2023-11-03 ASSESSMENT — ENCOUNTER SYMPTOMS
NERVOUS/ANXIOUS: 0
ABDOMINAL PAIN: 0
CHILLS: 0
HEADACHES: 0
SORE THROAT: 0
CONSTIPATION: 0
DIZZINESS: 0
HEARTBURN: 0
ARTHRALGIAS: 0
DIARRHEA: 0
EYE PAIN: 0
WEAKNESS: 0
HEMATOCHEZIA: 0
FEVER: 0
NAUSEA: 0
PALPITATIONS: 0
FREQUENCY: 0
BREAST MASS: 0
SHORTNESS OF BREATH: 0
JOINT SWELLING: 0
HEMATURIA: 0
DYSURIA: 0
COUGH: 0
PARESTHESIAS: 0
MYALGIAS: 0

## 2023-11-03 ASSESSMENT — ACTIVITIES OF DAILY LIVING (ADL): CURRENT_FUNCTION: NO ASSISTANCE NEEDED

## 2023-11-03 NOTE — PROGRESS NOTES
"SUBJECTIVE:   Clare is a 67 year old who presents for Preventive Visit.      11/3/2023     1:42 PM   Additional Questions   Roomed by Librado ZABALA.         11/3/2023     1:52 PM   Patient Reported Additional Medications   Patient reports taking the following new medications Claritin     Here for wellness, no concerns in particular.    Takes thyroid medication, valtrex for shingles prevention.  Takes hydrochlorothiazide for water retention, not hypertension.    Has carpal tunnel in primarily right wrist, occasionally left.  Uses brace intermittently. Was advised to do EMG and PT/OT for hands, did not, would like to get these done now, but referrals have .    Are you in the first 12 months of your Medicare coverage?  Yes,  Visual Acuity:  Right Eye: 10/10   Left Eye: 1016  Both Eyes: 10/10    Healthy Habits:     In general, how would you rate your overall health?  Excellent    Frequency of exercise:  6-7 days/week    Duration of exercise:  15-30 minutes    Do you usually eat at least 4 servings of fruit and vegetables a day, include whole grains    & fiber and avoid regularly eating high fat or \"junk\" foods?  Yes    Taking medications regularly:  Yes    Medication side effects:  None    Ability to successfully perform activities of daily living:  No assistance needed    Home Safety:  Lack of grab bars in the bathroom and lighting is poor    Hearing Impairment:  Difficulty following a conversation in a noisy restaurant or crowded room    In the past 6 months, have you been bothered by leaking of urine? Yes    In general, how would you rate your overall mental or emotional health?  Excellent    Additional concerns today:  No      Today's PHQ-2 Score:       2023     6:48 PM   PHQ-2 (  [employer])   Q1: Little interest or pleasure in doing things 0   Q2: Feeling down, depressed or hopeless 0   PHQ-2 Score 0   Q1: Little interest or pleasure in doing things Not at all   Q2: Feeling down, depressed or hopeless Not " at all   PHQ-2 Score 0           Have you ever done Advance Care Planning? (For example, a Health Directive, POLST, or a discussion with a medical provider or your loved ones about your wishes): No, advance care planning information given to patient to review.  Patient declined advance care planning discussion at this time.      Fall risk  Fallen 2 or more times in the past year?: No  Any fall with injury in the past year?: No    Cognitive Screening   1) Repeat 3 items (Leader, Season, Table)    2) Clock draw: ABNORMAL time shows 2:55  3) 3 item recall: Recalls 3 objects  Results: 3 items recalled: COGNITIVE IMPAIRMENT LESS LIKELY    Mini-CogTM Copyright S Raúl. Licensed by the author for use in [facility]; reprinted with permission (soob@[facility]). All rights reserved.      Do you have sleep apnea, excessive snoring or daytime drowsiness? : no    Reviewed and updated as needed this visit by clinical staff   Tobacco  Allergies  Meds              Reviewed and updated as needed this visit by Provider                 Social History     Tobacco Use     Smoking status: Never     Smokeless tobacco: Never   Substance Use Topics     Alcohol use: No             11/2/2023     6:48 PM   Alcohol Use   Prescreen: >3 drinks/day or >7 drinks/week? No     Do you have a current opioid prescription? No  Do you use any other controlled substances or medications that are not prescribed by a provider? None      Current providers sharing in care for this patient include:   Patient Care Team:  Masha, Remigio Langston as PCP - Yari Rabago MD as Assigned PCP  Carolyn Martinez MD as Assigned Musculoskeletal Provider  Belkis Jin OD as MD (Ophthalmology)  Belkis Jin OD as Assigned Surgical Provider    The following health maintenance items are reviewed in Epic and correct as of today:  Health Maintenance   Topic Date Due     DEXA  Never done     DTAP/TDAP/TD  IMMUNIZATION (1 - Tdap) Never done     IPV IMMUNIZATION (2 of 3 - Adult catch-up series) 05/22/2017     RSV VACCINE (Pregnancy & 60+) (1 - 1-dose 60+ series) Never done     Pneumococcal Vaccine: 65+ Years (1 - PCV) Never done     MAMMO SCREENING  05/05/2023     INFLUENZA VACCINE (1) 09/01/2023     ANNUAL REVIEW OF HM ORDERS  10/13/2023     MEDICARE ANNUAL WELLNESS VISIT  10/13/2023     TSH W/FREE T4 REFLEX  10/13/2023     COLORECTAL CANCER SCREENING  06/21/2024     FALL RISK ASSESSMENT  11/03/2024     ADVANCE CARE PLANNING  05/04/2026     LIPID  10/13/2027     HEPATITIS C SCREENING  Completed     HIV SCREENING  Completed     PHQ-2 (once per calendar year)  Completed     ZOSTER IMMUNIZATION  Completed     COVID-19 Vaccine  Completed     HPV IMMUNIZATION  Aged Out     MENINGITIS IMMUNIZATION  Aged Out     RSV MONOCLONAL ANTIBODY  Aged Out     PAP  Discontinued     Lab work is in process  Labs reviewed in EPIC  BP Readings from Last 3 Encounters:   11/03/23 135/80   12/13/22 110/72   10/13/22 116/76    Wt Readings from Last 3 Encounters:   11/03/23 100.1 kg (220 lb 9.6 oz)   10/13/22 102 kg (224 lb 12.8 oz)   09/28/22 102.1 kg (225 lb)                  Patient Active Problem List   Diagnosis     Choroidal nevus of right eye     Genital herpes     Headache     Heart murmur     Hypothyroidism     Menopausal state     Migraine     Myopia of right eye     Presbyopia     Regular astigmatism of left eye     Tinnitus     CTS (carpal tunnel syndrome)     Shingles     Past Surgical History:   Procedure Laterality Date     CHOLECYSTECTOMY      lap     COLONOSCOPY Left 6/21/2021    Procedure: COLONOSCOPY, WITH POLYPECTOMY AND BIOPSY; polypectomies using cold bx forcep and hot snare;  Surgeon: Germán Garcia MD;  Location:  GI       Social History     Tobacco Use     Smoking status: Never     Smokeless tobacco: Never   Substance Use Topics     Alcohol use: No     Family History   Problem Relation Age of Onset     Breast Cancer  Mother         Diagnosed in  at age 90; she  from complications after a fall in .     Hypertension Mother      Depression Mother         Diagnosed with depression when I was [age].     Hypereosinophilic syndrome Father 47     Colorectal Cancer Brother 66     Other Cancer Maternal Grandmother      Diabetes Paternal Grandfather              Colon Cancer Brother          from the disease in  at age 69     Substance Abuse Brother         Meth addict; incarcerated for possession in early . I m not sure if he is completely clean--he works, etc., but I don t see him often enough to know.     Glaucoma No family hx of      Macular Degeneration No family hx of          Current Outpatient Medications   Medication Sig Dispense Refill     hydrochlorothiazide (HYDRODIURIL) 12.5 MG tablet TAKE ONE TABLET BY MOUTH EVERY DAY 90 tablet 0     levothyroxine (SYNTHROID/LEVOTHROID) 125 MCG tablet TAKE 1 TABLET BY MOUTH EVERY DAY 90 tablet 0     Omega 3-6-9 Fatty Acids (OMEGA 3-6-9 PO)        valACYclovir (VALTREX) 1000 mg tablet Take 1 tablet (1,000 mg) by mouth 2 times daily 180 tablet 3     No Known Allergies  Recent Labs   Lab Test 10/13/22  1216 21  0806 21  1002   A1C  --   --  5.3   *  --  148*   HDL 63  --  57   TRIG 138  --  112   ALT 27  --  20   CR 0.80 0.87 0.79   GFRESTIMATED 82 70 79   GFRESTBLACK  --  82 >90   POTASSIUM 4.2 3.5 4.0   TSH 4.84*  --  2.54          Mammogram Screening:     FHS-7:       2023     7:02 PM   Breast CA Risk Assessment (FHS-7)   Did any of your first-degree relatives have breast or ovarian cancer? Yes   Did any of your relatives have bilateral breast cancer? Unknown   Did any man in your family have breast cancer? No   Did any woman in your family have breast and ovarian cancer? No   Did any woman in your family have breast cancer before age 50 y? No   Do you have 2 or more relatives with breast and/or ovarian cancer? No   Do you have 2 or more  "relatives with breast and/or bowel cancer? No       Mammogram Screening: Recommended mammography every 1-2 years with patient discussion and risk factor consideration  Pertinent mammograms are reviewed under the imaging tab.    Review of Systems   Constitutional:  Negative for chills and fever.   HENT:  Negative for congestion, ear pain, hearing loss and sore throat.    Eyes:  Negative for pain and visual disturbance.   Respiratory:  Negative for cough and shortness of breath.    Cardiovascular:  Negative for chest pain, palpitations and peripheral edema.   Gastrointestinal:  Negative for abdominal pain, constipation, diarrhea, heartburn, hematochezia and nausea.   Breasts:  Negative for tenderness, breast mass and discharge.   Genitourinary:  Negative for dysuria, frequency, genital sores, hematuria, pelvic pain, urgency, vaginal bleeding and vaginal discharge.   Musculoskeletal:  Negative for arthralgias, joint swelling and myalgias.   Skin:  Negative for rash.   Neurological:  Negative for dizziness, weakness, headaches and paresthesias.   Psychiatric/Behavioral:  Negative for mood changes. The patient is not nervous/anxious.          OBJECTIVE:   /80 (BP Location: Left arm, Patient Position: Sitting, Cuff Size: Adult Regular)   Pulse 64   Temp 98.2  F (36.8  C) (Oral)   Resp 12   Ht 1.689 m (5' 6.5\")   Wt 100.1 kg (220 lb 9.6 oz)   SpO2 96%   BMI 35.07 kg/m   Estimated body mass index is 35.07 kg/m  as calculated from the following:    Height as of this encounter: 1.689 m (5' 6.5\").    Weight as of this encounter: 100.1 kg (220 lb 9.6 oz).  Physical Exam  GENERAL: healthy, alert and no distress  EYES: Eyes grossly normal to inspection, PERRL and conjunctivae and sclerae normal  HENT: ear canals and TM's normal, nose and mouth without ulcers or lesions  NECK: no adenopathy, no asymmetry, masses, or scars and thyroid normal to palpation  RESP: lungs clear to auscultation - no rales, rhonchi or " "wheezes  CV: regular rate and rhythm, normal S1 S2, no S3 or S4, no murmur, click or rub, no peripheral edema and peripheral pulses strong  ABDOMEN: soft, nontender, no hepatosplenomegaly, no masses and bowel sounds normal  MS: no gross musculoskeletal defects noted, no edema  SKIN: no suspicious lesions or rashes  NEURO: Normal strength and tone, mentation intact and speech normal  PSYCH: mentation appears normal, affect normal/bright    Diagnostic Test Results:  Labs reviewed in Epic    ASSESSMENT / PLAN:       ICD-10-CM    1. Encounter for Medicare annual wellness exam  Z00.00 DX Hip/Pelvis/Spine     Hemoglobin A1c     Basic metabolic panel  (Ca, Cl, CO2, Creat, Gluc, K, Na, BUN)     Lipid panel reflex to direct LDL Fasting      2. Hypothyroidism, unspecified type  E03.9 TSH with free T4 reflex      3. History of shingles  Z86.19 valACYclovir (VALTREX) 1000 mg tablet      4. Bilateral carpal tunnel syndrome  G56.03 Occupational Therapy Referral     EMG      5. Post-menopausal  Z78.0 DX Hip/Pelvis/Spine      6. Visit for screening mammogram  Z12.31 MA Screen Bilateral w/Shaheed      7. Need for prophylactic vaccination and inoculation against influenza  Z23 INFLUENZA VACCINE 65+ (FLUZONE HD)          Patient has been advised of split billing requirements and indicates understanding: Yes      COUNSELING:  Reviewed preventive health counseling, as reflected in patient instructions      BMI:   Estimated body mass index is 35.07 kg/m  as calculated from the following:    Height as of this encounter: 1.689 m (5' 6.5\").    Weight as of this encounter: 100.1 kg (220 lb 9.6 oz).   Weight management plan: Discussed healthy diet and exercise guidelines      She reports that she has never smoked. She has never used smokeless tobacco.      Appropriate preventive services were discussed with this patient, including applicable screening as appropriate for fall prevention, nutrition, physical activity, Tobacco-use cessation, weight " loss and cognition.  Checklist reviewing preventive services available has been given to the patient.    Reviewed patients plan of care and provided an AVS. The Basic Care Plan (routine screening as documented in Health Maintenance) for Clare meets the Care Plan requirement. This Care Plan has been established and reviewed with the Patient.        Gi Chavez MD  [facility]    Identified Health Risks:  I have reviewed Opioid Use Disorder and Substance Use Disorder risk factors and made any needed referrals.

## 2023-11-03 NOTE — PATIENT INSTRUCTIONS
Try Allegra (Fexofenadine) or Zyrtec (Cetirizine)    Patient Education   Personalized Prevention Plan  You are due for the preventive services outlined below.  Your care team is available to assist you in scheduling these services.  If you have already completed any of these items, please share that information with your care team to update in your medical record.  Health Maintenance Due   Topic Date Due    Osteoporosis Screening  Never done    Diptheria Tetanus Pertussis (DTAP/TDAP/TD) Vaccine (1 - Tdap) Never done    Polio Vaccine (2 of 3 - Adult catch-up series) 05/22/2017    RSV VACCINE (Pregnancy & 60+) (1 - 1-dose 60+ series) Never done    Pneumococcal Vaccine (1 - PCV) Never done    Mammogram  05/05/2023    Flu Vaccine (1) 09/01/2023    ANNUAL REVIEW OF HM ORDERS  10/13/2023    Thyroid Function Lab  10/13/2023     Hearing Loss: Care Instructions  Overview     Hearing loss is a sudden or slow decrease in how well you hear. It can range from slight to profound. Permanent hearing loss can occur with aging. It also can happen when you are exposed long-term to loud noise. Examples include listening to loud music, riding motorcycles, or being around other loud machines.  Hearing loss can affect your work and home life. It can make you feel lonely or depressed. You may feel that you have lost your independence. But hearing aids and other devices can help you hear better and feel connected to others.  Follow-up care is a key part of your treatment and safety. Be sure to make and go to all appointments, and call your doctor if you are having problems. It's also a good idea to know your test results and keep a list of the medicines you take.  How can you care for yourself at home?  Avoid loud noises whenever possible. This helps keep your hearing from getting worse.  Always wear hearing protection around loud noises.  Wear a hearing aid as directed.  A professional can help you pick a hearing aid that will work best  "for you.  You can also get hearing aids over the counter for mild to moderate hearing loss.  Have hearing tests as your doctor suggests. They can show whether your hearing has changed. Your hearing aid may need to be adjusted.  Use other devices as needed. These may include:  Telephone amplifiers and hearing aids that can connect to a television, stereo, radio, or microphone.  Devices that use lights or vibrations. These alert you to the doorbell, a ringing telephone, or a baby monitor.  Television closed-captioning. This shows the words at the bottom of the screen. Most new TVs can do this.  TTY (text telephone). This lets you type messages back and forth on the telephone instead of talking or listening. These devices are also called TDD. When messages are typed on the keyboard, they are sent over the phone line to a receiving TTY. The message is shown on a monitor.  Use text messaging, social media, and email if it is hard for you to communicate by telephone.  Try to learn a listening technique called speechreading. It is not lipreading. You pay attention to people's gestures, expressions, posture, and tone of voice. These clues can help you understand what a person is saying. Face the person you are talking to, and have them face you. Make sure the lighting is good. You need to see the other person's face clearly.  Think about counseling if you need help to adjust to your hearing loss.  When should you call for help?  Watch closely for changes in your health, and be sure to contact your doctor if:    You think your hearing is getting worse.     You have new symptoms, such as dizziness or nausea.   Where can you learn more?  Go to https://www.[employer].net/patiented  Enter R798 in the search box to learn more about \"Hearing Loss: Care Instructions.\"  Current as of: March 1, 2023               Content Version: 13.7    1597-1598 [employer].   Care instructions adapted under license by your healthcare " professional. If you have questions about a medical condition or this instruction, always ask your healthcare professional. [employer], [facility] disclaims any warranty or liability for your use of this information.      Bladder Training: Care Instructions  Your Care Instructions     Bladder training is used to treat urge incontinence and stress incontinence. Urge incontinence means that the need to urinate comes on so fast that you can't get to a toilet in time. Stress incontinence means that you leak urine because of pressure on your bladder. For example, it may happen when you laugh, cough, or lift something heavy.  Bladder training can increase how long you can wait before you have to urinate. It can also help your bladder hold more urine. And it can give you better control over the urge to urinate.  It is important to remember that bladder training takes a few weeks to a few months to make a difference. You may not see results right away, but don't give up.  Follow-up care is a key part of your treatment and safety. Be sure to make and go to all appointments, and call your doctor if you are having problems. It's also a good idea to know your test results and keep a list of the medicines you take.  How can you care for yourself at home?  Work with your doctor to come up with a bladder training program that is right for you. You may use one or more of the following methods.  Delayed urination  In the beginning, try to keep from urinating for 5 minutes after you first feel the need to go.  While you wait, take deep, slow breaths to relax. Kegel exercises can also help you delay the need to go to the bathroom.  After some practice, when you can easily wait 5 minutes to urinate, try to wait 10 minutes before you urinate.  Slowly increase the waiting period until you are able to control when you have to urinate.  Scheduled urination  Empty your bladder when you first wake up in the morning.  Schedule times  "throughout the day when you will urinate.  Start by going to the bathroom every hour, even if you don't need to go.  Slowly increase the time between trips to the bathroom.  When you have found a schedule that works well for you, keep doing it.  If you wake up during the night and have to urinate, do it. Apply your schedule to waking hours only.  Kegel exercises  These tighten and strengthen pelvic muscles, which can help you control the flow of urine. (If doing these exercises causes pain, stop doing them and talk with your doctor.) To do Kegel exercises:  Squeeze your muscles as if you were trying not to pass gas. Or squeeze your muscles as if you were stopping the flow of urine. Your belly, legs, and buttocks shouldn't move.  Hold the squeeze for 3 seconds, then relax for 5 to 10 seconds.  Start with 3 seconds, then add 1 second each week until you are able to squeeze for 10 seconds.  Repeat the exercise 10 times a session. Do 3 to 8 sessions a day.  When should you call for help?  Watch closely for changes in your health, and be sure to contact your doctor if:    Your incontinence is getting worse.     You do not get better as expected.   Where can you learn more?  Go to https://www.[employer].net/patiented  Enter V684 in the search box to learn more about \"Bladder Training: Care Instructions.\"  Current as of: March 1, 2023               Content Version: 13.7    8448-5077 [employer].   Care instructions adapted under license by your healthcare professional. If you have questions about a medical condition or this instruction, always ask your healthcare professional. [employer] disclaims any warranty or liability for your use of this information.         "

## 2023-11-10 ENCOUNTER — LAB (OUTPATIENT)
Dept: LAB | Facility: CLINIC | Age: 68
End: 2023-11-10
Payer: COMMERCIAL

## 2023-11-10 DIAGNOSIS — E03.9 HYPOTHYROIDISM, UNSPECIFIED TYPE: ICD-10-CM

## 2023-11-10 DIAGNOSIS — Z00.00 ENCOUNTER FOR MEDICARE ANNUAL WELLNESS EXAM: ICD-10-CM

## 2023-11-10 DIAGNOSIS — E83.52 HYPERCALCEMIA: ICD-10-CM

## 2023-11-10 LAB — HBA1C MFR BLD: 5.5 % (ref 0–5.6)

## 2023-11-10 PROCEDURE — 83036 HEMOGLOBIN GLYCOSYLATED A1C: CPT

## 2023-11-10 PROCEDURE — 80061 LIPID PANEL: CPT

## 2023-11-10 PROCEDURE — 82306 VITAMIN D 25 HYDROXY: CPT

## 2023-11-10 PROCEDURE — 36415 COLL VENOUS BLD VENIPUNCTURE: CPT

## 2023-11-10 PROCEDURE — 80048 BASIC METABOLIC PNL TOTAL CA: CPT

## 2023-11-10 PROCEDURE — 84443 ASSAY THYROID STIM HORMONE: CPT

## 2023-11-11 LAB
ANION GAP SERPL CALCULATED.3IONS-SCNC: 8 MMOL/L (ref 7–15)
BUN SERPL-MCNC: 10.5 MG/DL (ref 8–23)
CALCIUM SERPL-MCNC: 10.6 MG/DL (ref 8.8–10.2)
CHLORIDE SERPL-SCNC: 103 MMOL/L (ref 98–107)
CHOLEST SERPL-MCNC: 245 MG/DL
CREAT SERPL-MCNC: 0.71 MG/DL (ref 0.51–0.95)
DEPRECATED HCO3 PLAS-SCNC: 27 MMOL/L (ref 22–29)
EGFRCR SERPLBLD CKD-EPI 2021: >90 ML/MIN/1.73M2
GLUCOSE SERPL-MCNC: 94 MG/DL (ref 70–99)
HDLC SERPL-MCNC: 56 MG/DL
LDLC SERPL CALC-MCNC: 170 MG/DL
NONHDLC SERPL-MCNC: 189 MG/DL
POTASSIUM SERPL-SCNC: 4.5 MMOL/L (ref 3.4–5.3)
SODIUM SERPL-SCNC: 138 MMOL/L (ref 135–145)
TRIGL SERPL-MCNC: 97 MG/DL
TSH SERPL DL<=0.005 MIU/L-ACNC: 0.34 UIU/ML (ref 0.3–4.2)

## 2023-11-13 DIAGNOSIS — E83.52 HYPERCALCEMIA: Primary | ICD-10-CM

## 2023-11-13 LAB — VIT D+METAB SERPL-MCNC: 14 NG/ML (ref 20–50)

## 2023-11-14 DIAGNOSIS — E55.9 VITAMIN D DEFICIENCY: Primary | ICD-10-CM

## 2023-11-14 RX ORDER — ERGOCALCIFEROL 1.25 MG/1
50000 CAPSULE, LIQUID FILLED ORAL WEEKLY
Qty: 12 CAPSULE | Refills: 0 | Status: SHIPPED | OUTPATIENT
Start: 2023-11-14 | End: 2024-01-31

## 2023-11-17 ENCOUNTER — THERAPY VISIT (OUTPATIENT)
Dept: OCCUPATIONAL THERAPY | Facility: CLINIC | Age: 68
End: 2023-11-17
Attending: FAMILY MEDICINE
Payer: COMMERCIAL

## 2023-11-17 DIAGNOSIS — G56.03 BILATERAL CARPAL TUNNEL SYNDROME: Primary | ICD-10-CM

## 2023-11-17 PROCEDURE — 97760 ORTHOTIC MGMT&TRAING 1ST ENC: CPT | Mod: GO | Performed by: OCCUPATIONAL THERAPIST

## 2023-11-17 PROCEDURE — 97165 OT EVAL LOW COMPLEX 30 MIN: CPT | Mod: GO | Performed by: OCCUPATIONAL THERAPIST

## 2023-11-17 PROCEDURE — 97530 THERAPEUTIC ACTIVITIES: CPT | Mod: GO | Performed by: OCCUPATIONAL THERAPIST

## 2023-11-17 NOTE — PROGRESS NOTES
OCCUPATIONAL THERAPY EVALUATION  Type of Visit: Evaluation    See electronic medical record for Abuse and Falls Screening details.    Subjective      Presenting condition or subjective complaint: Carpal tunnel syndrome  Date of onset: 11/03/23 (order date)    Relevant medical history: Overweight; Thyroid problems; Vision problems   Dates & types of surgery: Gallbladder removed 2008    Prior diagnostic imaging/testing results:     EMG  Prior therapy history for the same diagnosis, illness or injury: No      Prior Level of Function  Transfers:   Ambulation:   ADL:   IADL:     Living Environment  Social support: With a significant other or spouse   Type of home: House   Stairs to enter the home: No       Ramp: No   Stairs inside the home: Yes 10 Is there a railing: Yes   Help at home:    Equipment owned:       Employment: Yes Writer, professor, and musician (anderson)  Hobbies/Interests: Reading, films    Patient goals for therapy: Avoid numbness in hand in the morning     Objective   Right hand dominant  Patient reports symptoms of pain and numbness  -Better when not typing, but worsens with repetitive typing  -Never during the daytime, worst upon waking. R>L, rare on the left    Pain Level (Scale 0-10):   11/17/2023   At Rest 0/10   With Use 1/10     Pain Description:  Date 11/17/2023   Location wrist, hand, thumb, index finger, long finger, and ring finger   Pain Quality Aching   Frequency intermittent     Pain is worst  daytime   Exacerbated by Waking upon the morning time   Relieved by stretch   Progression Unchanged     Edema  Mild over right radial wrist    Sensation  Decreased Median Nerve distribution per pt report    ROM  Pain Report: - none  + mild    ++ moderate    +++ severe   Wrist 11/17/2023 11/17/2023   AROM (PROM) L R   Extension 62 56 +   Flexion 61 66   RD 17 17 tightness   UD 34 34     Special Tests  Pain Report:  - none    + mild    ++ moderate    +++ severe    11/17/2023   Median Nerve Compression  at Pronator -   Carpal Compression Test--Durkan Test (30 sec) +   Felton Test for Lumbrical Incursion  (fist x 30 secs) - though pressure on dorsum of hand   Tinels at Carpal Tunnel +   Phalens + R thumb     Neural Tension Testing  MNT: Median Neurodynamic Test (based on DS Klaus's ULNT)   11/17/2023   0-5 Scale 5/5 B though more tight on R vs L   Position:   0/5: Arm across abdomen in coronal plane  1/5: Depress shoulder, ER to neutral ABD shoulder to 45 degrees  2/5: ER shoulder to end range, keep elbow at 90 degrees  3/5: Extend elbow to 0 degrees  4/5: Fully supinate forearm  5/5: Extend wrist, fingers and thumb  Notes:  (+) indicates beyond grade level but less than [facility] to next level  (-) indicates over [facility] to level  S1  onset/change of patient's symptoms  S2 definite stop point based on patient's discomfort level    Strength   (Measured in pounds)  Pain Report: - none  + mild    ++ moderate    +++ severe    11/17/2023 11/17/2023   Trials L R   1   48 54   Average 48 54     Lat Pinch 11/17/2023 11/17/2023   Trials L R   1   15 18   Average 15 18     3 Pt Pinch 11/17/2023 11/17/2023   Trials L R   1   14 8   Average 14 8 - tougher     Palpation  Pain Report:  - none    + mild    ++ moderate    +++ severe    11/17/2023   Flexor wad -   Extensor wad -   Pronator teres -                 Assessment & Plan   CLINICAL IMPRESSIONS  Medical Diagnosis: B CTS    Treatment Diagnosis: B numbness and tingling    Impression/Assessment: Pt is a 67 year old female presenting to Occupational Therapy due to gradual onset.  The following significant findings have been identified: Impaired ROM, Impaired sensation, Impaired strength, and Pain.  These identified deficits interfere with their ability to perform self care tasks, work tasks, recreational activities, household chores, driving , household mobility, and meal planning and preparation as compared to previous level of function.     Clinical Decision Making  (Complexity):  Assessment of Occupational Performance: 5 or more Performance Deficits  Occupational Performance Limitations: bathing/showering, toileting, dressing, feeding, functional mobility, personal device care, hygiene and grooming, driving and community mobility, health management and maintenance, home establishment and management, meal preparation and cleanup, shopping, sleep, work, and leisure activities  Clinical Decision Making (Complexity): Low complexity    PLAN OF CARE  Treatment Interventions:  Modalities:  US and Paraffin  Therapeutic Exercise:  AROM, AAROM, PROM, Tendon Gliding, Blocking, Reverse Blocking, Place and Hold, Contract Relax, Extensor Tracking, Isotonics, Isometrics and Stabilization  Neuromuscular re-education:  Nerve Gliding, Coordination/Dexterity, Sensory re-education, Desensitization, Kinesthetic Training, Proprioceptive Training, Kinesiotaping, Strain Counter Strain, Isometrics, Stabilization   Manual Techniques:  Coordination/Dexterity, Joint mobilization, Scar mobilization, Friction massage, Myofascial release, and Manual edema mobilization  Orthotic Fabrication:  Static and Forearm based  Self Care:  Self Care Tasks, Ergonomic Considerations, and Work Tasks    Long Term Goals   OT Goal 1  Goal Identifier: Household IADLs - gripping  Goal Description: Open a tight or new jar with mild to no difficulty  Rationale: In order to maximize safety and independence with performance of self-care activities  Target Date: 01/12/24      Frequency of Treatment: 1x/week  Duration of Treatment: 4 weeks, tapering to biweekly for 4 weeks     Recommended Referrals to Other Professionals:   Education Assessment: Learner/Method: Patient;Pictures/Video  Education Comments: PTRX on phone     Risks and benefits of evaluation/treatment have been explained.   Patient/Family/caregiver agrees with Plan of Care.     Evaluation Time:    OT Eval, Low Complexity Minutes (04460): 30       Signing Clinician:  AARON Schaffer [facility]                                                                                   OUTPATIENT OCCUPATIONAL THERAPY      PLAN OF TREATMENT FOR OUTPATIENT REHABILITATION   Patient's Last Name, First Name, Clare Flaherty YOB: 1955   Provider's Name   [facility]   Medical Record No.  [id number]     Onset Date: 11/03/23 (order date) Start of Care Date: 11/17/23     Medical Diagnosis:  B CTS      OT Treatment Diagnosis:  B numbness and tingling Plan of Treatment  Frequency/Duration:1x/week/4 weeks, tapering to biweekly for 4 weeks    Certification date from 11/17/23   To 01/12/24        See note for plan of treatment details and functional goals     Latoya Elliott OT                         I CERTIFY THE NEED FOR THESE SERVICES FURNISHED UNDER        THIS PLAN OF TREATMENT AND WHILE UNDER MY CARE     (Physician attestation of this document indicates review and certification of the therapy plan).              Referring Provider:  Gi Chavez    Initial Assessment  See Epic Evaluation- 11/17/23

## 2023-11-24 ENCOUNTER — ANCILLARY PROCEDURE (OUTPATIENT)
Dept: MAMMOGRAPHY | Facility: CLINIC | Age: 68
End: 2023-11-24
Attending: FAMILY MEDICINE
Payer: COMMERCIAL

## 2023-11-24 ENCOUNTER — THERAPY VISIT (OUTPATIENT)
Dept: OCCUPATIONAL THERAPY | Facility: CLINIC | Age: 68
End: 2023-11-24
Attending: FAMILY MEDICINE
Payer: COMMERCIAL

## 2023-11-24 DIAGNOSIS — G56.00 CTS (CARPAL TUNNEL SYNDROME): Primary | ICD-10-CM

## 2023-11-24 DIAGNOSIS — Z12.31 VISIT FOR SCREENING MAMMOGRAM: ICD-10-CM

## 2023-11-24 PROCEDURE — 97140 MANUAL THERAPY 1/> REGIONS: CPT | Mod: GO | Performed by: OCCUPATIONAL THERAPIST

## 2023-11-24 PROCEDURE — 77067 SCR MAMMO BI INCL CAD: CPT | Mod: TC | Performed by: STUDENT IN AN ORGANIZED HEALTH CARE EDUCATION/TRAINING PROGRAM

## 2023-11-24 PROCEDURE — 97112 NEUROMUSCULAR REEDUCATION: CPT | Mod: GO | Performed by: OCCUPATIONAL THERAPIST

## 2023-11-24 PROCEDURE — 77063 BREAST TOMOSYNTHESIS BI: CPT | Mod: TC | Performed by: STUDENT IN AN ORGANIZED HEALTH CARE EDUCATION/TRAINING PROGRAM

## 2023-12-04 ENCOUNTER — THERAPY VISIT (OUTPATIENT)
Dept: OCCUPATIONAL THERAPY | Facility: CLINIC | Age: 68
End: 2023-12-04
Attending: FAMILY MEDICINE
Payer: COMMERCIAL

## 2023-12-04 DIAGNOSIS — G56.00 CTS (CARPAL TUNNEL SYNDROME): Primary | ICD-10-CM

## 2023-12-04 DIAGNOSIS — E03.9 HYPOTHYROIDISM, UNSPECIFIED TYPE: ICD-10-CM

## 2023-12-04 PROCEDURE — 97110 THERAPEUTIC EXERCISES: CPT | Mod: GO | Performed by: OCCUPATIONAL THERAPIST

## 2023-12-04 RX ORDER — LEVOTHYROXINE SODIUM 125 UG/1
125 TABLET ORAL DAILY
Qty: 90 TABLET | Refills: 0 | Status: SHIPPED | OUTPATIENT
Start: 2023-12-04 | End: 2024-03-11

## 2023-12-06 DIAGNOSIS — J30.2 OTHER SEASONAL ALLERGIC RHINITIS: ICD-10-CM

## 2023-12-06 RX ORDER — LORATADINE 10 MG/1
10 TABLET ORAL DAILY
Qty: 90 TABLET | Refills: 3 | Status: SHIPPED | OUTPATIENT
Start: 2023-12-06 | End: 2024-08-30

## 2023-12-07 DIAGNOSIS — I10 BENIGN ESSENTIAL HYPERTENSION: ICD-10-CM

## 2023-12-07 RX ORDER — HYDROCHLOROTHIAZIDE 12.5 MG/1
12.5 TABLET ORAL DAILY
Qty: 90 TABLET | Refills: 0 | OUTPATIENT
Start: 2023-12-07

## 2023-12-26 DIAGNOSIS — I10 BENIGN ESSENTIAL HYPERTENSION: ICD-10-CM

## 2023-12-27 RX ORDER — HYDROCHLOROTHIAZIDE 12.5 MG/1
12.5 TABLET ORAL DAILY
Qty: 90 TABLET | Refills: 2 | Status: SHIPPED | OUTPATIENT
Start: 2023-12-27

## 2024-03-10 DIAGNOSIS — E03.9 HYPOTHYROIDISM, UNSPECIFIED TYPE: ICD-10-CM

## 2024-03-11 RX ORDER — LEVOTHYROXINE SODIUM 125 UG/1
125 TABLET ORAL DAILY
Qty: 90 TABLET | Refills: 0 | Status: SHIPPED | OUTPATIENT
Start: 2024-03-11 | End: 2024-06-05

## 2024-06-05 DIAGNOSIS — E03.9 HYPOTHYROIDISM, UNSPECIFIED TYPE: ICD-10-CM

## 2024-06-05 RX ORDER — LEVOTHYROXINE SODIUM 125 UG/1
125 TABLET ORAL DAILY
Qty: 90 TABLET | Refills: 0 | Status: SHIPPED | OUTPATIENT
Start: 2024-06-05 | End: 2024-09-06

## 2024-08-22 DIAGNOSIS — M25.552 LEFT HIP PAIN: Primary | ICD-10-CM

## 2024-08-30 ENCOUNTER — OFFICE VISIT (OUTPATIENT)
Dept: ORTHOPEDICS | Facility: CLINIC | Age: 69
End: 2024-08-30
Payer: COMMERCIAL

## 2024-08-30 ENCOUNTER — ANCILLARY PROCEDURE (OUTPATIENT)
Dept: GENERAL RADIOLOGY | Facility: CLINIC | Age: 69
End: 2024-08-30
Attending: FAMILY MEDICINE
Payer: COMMERCIAL

## 2024-08-30 ENCOUNTER — PRE VISIT (OUTPATIENT)
Dept: ORTHOPEDICS | Facility: CLINIC | Age: 69
End: 2024-08-30

## 2024-08-30 DIAGNOSIS — M70.62 GREATER TROCHANTERIC BURSITIS, LEFT: Primary | ICD-10-CM

## 2024-08-30 DIAGNOSIS — M25.552 LEFT HIP PAIN: ICD-10-CM

## 2024-08-30 PROCEDURE — 99213 OFFICE O/P EST LOW 20 MIN: CPT | Performed by: FAMILY MEDICINE

## 2024-08-30 PROCEDURE — 73502 X-RAY EXAM HIP UNI 2-3 VIEWS: CPT | Mod: LT | Performed by: RADIOLOGY

## 2024-08-30 NOTE — TELEPHONE ENCOUNTER
DIAGNOSIS: Left hip pain began last Thursday, 8/15, and was so severe that I could only walk by turning out my left foot 45 degrees. The condition has improved this week, but there is still intermittent pain when walking.     APPOINTMENT DATE: 8.30.24   NOTES STATUS DETAILS   MEDICATION LIST Internal    XRAYS (IMAGES & REPORTS) In process *sched* for 8.30.24  XR Pelvis and Hip Left

## 2024-08-30 NOTE — LETTER
8/30/2024      RE: Clare Wallace  [id number]  [place] [facility] [place] [facility] [id number]     Dear Colleague,    Thank you for referring your patient, Clare Wallace, to the [facility] [place]. Please see a copy of my visit note below.    CHIEF COMPLAINT:  Pain of the Left Hip       HISTORY OF PRESENT ILLNESS  Ms. Wallace is a pleasant 68 year old year old female who presents to clinic today with left hip pain.  Clare states that she was walking about 2 weeks ago and took a step forward with her left hip and felt acute severe pain at the lateral aspect of it.  She notes that this improved greatly over the past 2 weeks and it is nearly resolved, but she wants to present today to make sure that she does not have this happen to her again.    She notes that the pain at the lateral hip was initially very severe with any walking and she felt that she had to externally rotate her hip and duck walk with her left leg to find relief.  They gradually improved with heat and ibuprofen.  She notes little to no pain now.    Onset: gradual  Location: left hip; lateral   Quality:  sharp  Duration: 2 weeks   Severity: 9/10 at worst  Timing:improving.   Modifying factors:  resting and non-use makes it better, movement and use makes it worse  Associated signs & symptoms: pain  Previous similar pain: No  Treatments to date:Heat and ibuprofen/tylenol     Additional history: as documented    Review of Systems:  Have you recently had a a fever, chills, weight loss? No  Do you have any vision problems? No  Do you have any chest pain or edema? No  Do you have any shortness of breath or wheezing?  No  Do you have stomach problems? No  Do you have any numbness or focal weakness? No  Do you have diabetes? No  Do you have problems with bleeding or clotting? No  Do you have an rashes or other skin lesions? No    MEDICAL HISTORY  Patient Active Problem List   Diagnosis     Choroidal nevus of right eye     Genital herpes      Headache     Heart murmur     Hypothyroidism     Menopausal state     Migraine     Myopia of right eye     Presbyopia     Regular astigmatism of left eye     Tinnitus     CTS (carpal tunnel syndrome)     Shingles       Current Outpatient Medications   Medication Sig Dispense Refill     hydrochlorothiazide (HYDRODIURIL) 12.5 MG tablet TAKE 1 TABLET BY MOUTH EVERY DAY 90 tablet 2     levothyroxine (SYNTHROID/LEVOTHROID) 125 MCG tablet TAKE 1 TABLET BY MOUTH EVERY DAY 90 tablet 0     Omega 3-6-9 Fatty Acids (OMEGA 3-6-9 PO)        valACYclovir (VALTREX) 1000 mg tablet Take 1 tablet (1,000 mg) by mouth 2 times daily 180 tablet 3       No Known Allergies    Family History   Problem Relation Age of Onset     Breast Cancer Mother         Diagnosed in  at age 90; she  from complications after a fall in .     Hypertension Mother      Depression Mother         Diagnosed with depression when I was [age].     Hypereosinophilic syndrome Father 47     Colorectal Cancer Brother      Colon Cancer Brother          from the disease in  at age 69     Substance Abuse Brother         Meth addict; incarcerated for possession in early . I m not sure if he is completely clean--he works, etc., but I don t see him often enough to know.     Other Cancer Maternal Grandmother      Diabetes Paternal Grandfather              Glaucoma No family hx of      Macular Degeneration No family hx of        Additional medical/Social/Surgical histories reviewed in [facility] and updated as appropriate.       PHYSICAL EXAM  There were no vitals taken for this visit.    General  - normal appearance, in no obvious distress  Musculoskeletal -left hip  - stance: normal gait without limp  - inspection: no swelling or effusion, normal bone and joint alignment, no obvious deformity  - palpation: tender over the greater trochanter laterally. No anterior hip tenderness. No pain of buttock region.  - ROM: pain with active abduction, normal  and painless flexion, extension, IR, ER  - strength: 5/5 in all planes  - special tests:  (-) SHIRLEY  (-) FADIR  Neuro  - no sensory or motor deficit, grossly normal coordination, normal muscle tone      IMAGING : X-ray left hip 2 views. Final results and radiologist's interpretation, available in the [facility] health record. Images were reviewed with the patient/family members in the office today. My personal interpretation of the performed imaging is no acute osseous abnormality.  She does have minimal bilateral symmetric hip OA.  Left enthesopathic changes overlying the greater trochanter they are fairly asymmetric.     ASSESSMENT & PLAN  Ms. Wallace is a 68 year old year old female who presents to clinic today with acute lateral hip pain 2 weeks ago that is subsided greatly with conservative measures.    X-rays overall reassuring, although enthesopathy provides us with additional details that she may be experiencing chronic tendinopathy and trochanteric bursitis that may have been caused by walking 2 weeks ago.    Diagnosis: Trochanteric bursitis of left hip    I have recommended that she start a home exercise program for conditioning her trochanteric tissues including gluteus medius and minimus tendons.  I discussed that tendon health is imperative especially in the setting of these and these hepatic changes.  If pain does recur we can consider injections, formal physical therapy as well.  I did not offer these options today as she has little to no pain at this time.      She will return if pain does recur for further discussions and she is happy with the answers I provided to all of her questions.    It was a pleasure seeing Clare today.    Aiden Carr DO, [facility]  Primary Care Sports Medicine       Again, thank you for allowing me to participate in the care of your patient.      Sincerely,    Aiden Carr DO

## 2024-08-30 NOTE — PATIENT INSTRUCTIONS
Trochanteric Bursitis / Gluteal Tendinopathy    WHAT IS TROCHANTERIC BURSITIS?    Bursitis is irritation or inflammation of the bursa. A bursa is a fluid-filled sac that acts as a cushion between tendons, bones, and skin. There is a bump on the outer side of the upper part of the thigh bone (femur) called the greater trochanter. The trochanteric bursa is located over the greater trochanter. When this bursa is inflamed it is called trochanteric bursitis.          WHAT IS THE CAUSE?     The trochanteric bursa may be inflamed by a group of muscles or tendons rubbing over the bursa and causing friction against the thigh bone. Your iliotibial band goes from the iliac crest of your pelvis down the outer side of your thigh and attaches just below the knee. A tight iliotibial band can lead to trochanteric bursitis. This injury can occur with running, walking, or bicycling, especially when the bicycle seat is too high.    Trochanteric bursitis may also be caused by a fall, by a spine disorder, by differences in the length of your legs, or as a complication of hip surgery.    WHAT ARE THE SYMPTOMS?    You have pain on the upper outer area of your thigh or on the side of your hip. The pain is worse when you walk, bicycle, or go up or down stairs. You have pain when you move your thigh bone and feel tenderness in the area over the greater trochanter.    HOW IS IT DIAGNOSED?    Your healthcare provider will ask about your symptoms and examine your hip and thigh.    HOW IS IT TREATED?    To treat this condition:    Put an ice pack, gel pack, or package of frozen vegetables wrapped in a cloth on the painful area every 3 to 4 hours for up to 20 minutes at a time until the pain goes away.  Take an anti-inflammatory medicine, such as ibuprofen, as directed by your provider. Nonsteroidal anti-inflammatory medicines (NSAIDs) may cause stomach bleeding and other problems. These risks increase with age. Read the label and take as  directed. Unless recommended by your healthcare provider, do not take for more than 10 days.  Follow your provider s instructions for doing exercises to help you recover.    While you are recovering from your injury you will need to change your sport or activity to one that does not make your condition worse. For example, you may need to swim instead of running or bicycling. If you are bicycling, you may need to lower your bicycle seat.    A bursa that is only mildly inflamed and has just started to hurt may improve within a few weeks. A bursa that is significantly inflamed and has been painful for a long time may take up to a few months to improve. You need to stop doing the activities that cause pain until your bursa has healed.    Follow your healthcare provider's instructions. Ask your provider:    How and when you will hear your test results  How long it will take to recover  What activities you should avoid and when you can return to your normal activities  How to take care of yourself at home  What symptoms or problems you should watch for and what to do if you have them  Make sure you know when you should come back for a checkup.    HOW CAN I HELP PREVENT TROCHANTERIC BURSITIS?    Trochanteric bursitis is best prevented by warming up properly and stretching the muscles on the outer side of your upper thigh.    Developed by [employer].  Published by [employer].  Copyright  2014 [employer] and/or one of its subsidiaries. All rights reserved.    You can do the first 3 stretches to begin stretching the muscles that run along the outside of your hip. You can do the strengthening exercises when the sharp pain lessens.    STRETCHING EXERCISES    Gluteal stretch: Lie on your back with both knees bent. Rest the ankle on your injured side over the knee of your other leg. Grasp the thigh of the leg on the uninjured side and pull toward your chest. You will feel a stretch along the buttocks on the injured  side and possibly along the outside of your hip. Hold the stretch for 15 to 30 seconds. Repeat 3 times.    Iliotibial band stretch, standing: Cross your uninjured leg in front of the other leg and bend down and reach toward the inside of your back foot. Do not bend your knees. Hold this position for 15 to 30 seconds. Return to the starting position. Repeat 3 times.  Iliotibial band stretch, side-leaning: Stand sideways near a wall with your injured side closest to the wall. Place a hand on the wall for support. Cross the leg farther from the wall over the other leg. Keep the foot closest to the wall flat on the floor. Lean your hips into the wall. Hold the stretch for 15 to 30 seconds. Repeat 3 times.    STRENGTHENING EXERCISES    Straight leg raise: Lie on your back with your legs straight out in front of you. Bend the knee on your uninjured side and place the foot flat on the floor. Tighten the thigh muscle on your injured side and lift your leg about 8 inches off the floor. Keep your leg straight and your thigh muscle tight. Slowly lower your leg back down to the floor. Do 2 sets of 15.    Prone hip extension: Lie on your stomach with your legs straight out behind you. Fold your arms under your head and rest your head on your arms. Draw your belly button in towards your spine and tighten your abdominal muscles. Tighten the buttocks and thigh muscles of the leg on your injured side and lift the leg off the floor about 8 inches. Keep your leg straight. Hold for 5 seconds. Then lower your leg and relax. Do 2 sets of 15.    Side-lying leg lift: Lie on your uninjured side. Tighten the front thigh muscles on your injured leg and lift that leg 8 to 10 inches (20 to 25 centimeters) away from the other leg. Keep the leg straight and lower it slowly. Do 2 sets of 15.    Wall squat with a ball: Stand with your back, shoulders, and head against a wall. Look straight ahead. Keep your shoulders relaxed and your feet 3 feet (90  centimeters) from the wall and shoulder's width apart. Place a soccer or basketball-sized ball behind your back. Keeping your back against the wall, slowly squat down to a 45-degree angle. Your thighs will not yet be parallel to the floor. Hold this position for 10 seconds and then slowly slide back up the wall. Repeat 10 times. Build up to 2 sets of 15.    Clam exercise: Lie on your uninjured side with your hips and knees bent and feet together. Slowly raise your top leg toward the ceiling while keeping your heels touching each other. Hold for 2 seconds and lower slowly. Do 2 sets of 15 repetitions.    Side plank: Lie on your side with your legs, hips, and shoulders in a straight line. Prop yourself up onto your forearm with your elbow directly under your shoulder. Lift your hips off the floor and balance on your forearm and the outside of your foot. Try to hold this position for 15 seconds and then slowly lower your hip to the ground. Switch sides and repeat. Work up to holding for 1 minute. This exercise can be made easier by starting with your knees and hips flexed toward your chest.    The plank: Lie on your stomach resting on our forearms. With your legs straight, lift your hips off the floor until they are in line with your shoulders. Support yourself on your forearms and toes. Hold this position for 15 seconds. (If this is too difficult, you can modify it by placing your knees on the floor.) Repeat 3 times. Work up to increasing your hold time to 30 to 60 seconds.    Developed by [employer].  Published by [employer].  Copyright  2014 [employer] and/or one of its subsidiaries. All rights reserved.

## 2024-08-30 NOTE — PROGRESS NOTES
CHIEF COMPLAINT:  Pain of the Left Hip       HISTORY OF PRESENT ILLNESS  Ms. Wallace is a pleasant 68 year old year old female who presents to clinic today with left hip pain.  Clare states that she was walking about 2 weeks ago and took a step forward with her left hip and felt acute severe pain at the lateral aspect of it.  She notes that this improved greatly over the past 2 weeks and it is nearly resolved, but she wants to present today to make sure that she does not have this happen to her again.    She notes that the pain at the lateral hip was initially very severe with any walking and she felt that she had to externally rotate her hip and duck walk with her left leg to find relief.  They gradually improved with heat and ibuprofen.  She notes little to no pain now.    Onset: gradual  Location: left hip; lateral   Quality:  sharp  Duration: 2 weeks   Severity: 9/10 at worst  Timing:improving.   Modifying factors:  resting and non-use makes it better, movement and use makes it worse  Associated signs & symptoms: pain  Previous similar pain: No  Treatments to date:Heat and ibuprofen/tylenol     Additional history: as documented    Review of Systems:  Have you recently had a a fever, chills, weight loss? No  Do you have any vision problems? No  Do you have any chest pain or edema? No  Do you have any shortness of breath or wheezing?  No  Do you have stomach problems? No  Do you have any numbness or focal weakness? No  Do you have diabetes? No  Do you have problems with bleeding or clotting? No  Do you have an rashes or other skin lesions? No    MEDICAL HISTORY  Patient Active Problem List   Diagnosis    Choroidal nevus of right eye    Genital herpes    Headache    Heart murmur    Hypothyroidism    Menopausal state    Migraine    Myopia of right eye    Presbyopia    Regular astigmatism of left eye    Tinnitus    CTS (carpal tunnel syndrome)    Shingles       Current Outpatient Medications   Medication Sig Dispense  Refill    hydrochlorothiazide (HYDRODIURIL) 12.5 MG tablet TAKE 1 TABLET BY MOUTH EVERY DAY 90 tablet 2    levothyroxine (SYNTHROID/LEVOTHROID) 125 MCG tablet TAKE 1 TABLET BY MOUTH EVERY DAY 90 tablet 0    Omega 3-6-9 Fatty Acids (OMEGA 3-6-9 PO)       valACYclovir (VALTREX) 1000 mg tablet Take 1 tablet (1,000 mg) by mouth 2 times daily 180 tablet 3       No Known Allergies    Family History   Problem Relation Age of Onset    Breast Cancer Mother         Diagnosed in  at age 90; she  from complications after a fall in .    Hypertension Mother     Depression Mother         Diagnosed with depression when I was [age].    Hypereosinophilic syndrome Father 47    Colorectal Cancer Brother     Colon Cancer Brother          from the disease in  at age 69    Substance Abuse Brother         Meth addict; incarcerated for possession in early . I m not sure if he is completely clean--he works, etc., but I don t see him often enough to know.    Other Cancer Maternal Grandmother     Diabetes Paternal Grandfather             Glaucoma No family hx of     Macular Degeneration No family hx of        Additional medical/Social/Surgical histories reviewed in [facility] and updated as appropriate.       PHYSICAL EXAM  There were no vitals taken for this visit.    General  - normal appearance, in no obvious distress  Musculoskeletal -left hip  - stance: normal gait without limp  - inspection: no swelling or effusion, normal bone and joint alignment, no obvious deformity  - palpation: tender over the greater trochanter laterally. No anterior hip tenderness. No pain of buttock region.  - ROM: pain with active abduction, normal and painless flexion, extension, IR, ER  - strength: 5/5 in all planes  - special tests:  (-) SHIRLEY  (-) FADIR  Neuro  - no sensory or motor deficit, grossly normal coordination, normal muscle tone      IMAGING : X-ray left hip 2 views. Final results and radiologist's interpretation,  available in the [facility] health record. Images were reviewed with the patient/family members in the office today. My personal interpretation of the performed imaging is no acute osseous abnormality.  She does have minimal bilateral symmetric hip OA.  Left enthesopathic changes overlying the greater trochanter they are fairly asymmetric.     ASSESSMENT & PLAN  Ms. Wallace is a 68 year old year old female who presents to clinic today with acute lateral hip pain 2 weeks ago that is subsided greatly with conservative measures.    X-rays overall reassuring, although enthesopathy provides us with additional details that she may be experiencing chronic tendinopathy and trochanteric bursitis that may have been caused by walking 2 weeks ago.    Diagnosis: Trochanteric bursitis of left hip    I have recommended that she start a home exercise program for conditioning her trochanteric tissues including gluteus medius and minimus tendons.  I discussed that tendon health is imperative especially in the setting of these and these hepatic changes.  If pain does recur we can consider injections, formal physical therapy as well.  I did not offer these options today as she has little to no pain at this time.      She will return if pain does recur for further discussions and she is happy with the answers I provided to all of her questions.    It was a pleasure seeing Clare today.    Aiden Carr DO, [facility]  Primary [facility] Medicine

## 2024-09-05 ENCOUNTER — TRANSFERRED RECORDS (OUTPATIENT)
Dept: HEALTH INFORMATION MANAGEMENT | Facility: CLINIC | Age: 69
End: 2024-09-05
Payer: COMMERCIAL

## 2024-09-06 DIAGNOSIS — E03.9 HYPOTHYROIDISM, UNSPECIFIED TYPE: ICD-10-CM

## 2024-09-06 RX ORDER — LEVOTHYROXINE SODIUM 125 UG/1
125 TABLET ORAL DAILY
Qty: 90 TABLET | Refills: 0 | Status: SHIPPED | OUTPATIENT
Start: 2024-09-06

## 2024-09-25 ENCOUNTER — PATIENT OUTREACH (OUTPATIENT)
Dept: GASTROENTEROLOGY | Facility: CLINIC | Age: 69
End: 2024-09-25
Payer: COMMERCIAL

## 2024-09-25 DIAGNOSIS — Z12.11 SPECIAL SCREENING FOR MALIGNANT NEOPLASMS, COLON: Primary | ICD-10-CM

## 2024-09-25 NOTE — PROGRESS NOTES
"Pt with hx of adenomatous polyps with 3-5yr recall recommended on last colonoscopy performed in 2021    [facility] Screening Colonoscopy Referral Review    Patient meets the inclusion criteria for screening colonoscopy standing order.    Ordering/Referring Provider:  Gi Marion     BMI: Estimated body mass index is 35.07 kg/m  as calculated from the following:    Height as of 11/3/23: 1.689 m (5' 6.5\").    Weight as of 11/3/23: 100.1 kg (220 lb 9.6 oz).     Sedation:  Does patient have any of the following conditions affecting sedation?  No medical conditions affecting sedation.    Previous Scopes:  Any previous recommendations or follow up needs based on previous scope?  na / No recommendations.    Medical Concerns to Postpone Order:  Does patient have any of the following medical concerns that should postpone/delay colonoscopy referral?  No medical conditions affecting colonoscopy referral.    Final Referral Details:  Based on patient's medical history patient is appropriate for referral order with moderate sedation. If patient's BMI > 50 do not schedule in [facility].  "

## 2024-11-07 SDOH — HEALTH STABILITY: PHYSICAL HEALTH: ON AVERAGE, HOW MANY MINUTES DO YOU ENGAGE IN EXERCISE AT THIS LEVEL?: 30 MIN

## 2024-11-07 SDOH — HEALTH STABILITY: PHYSICAL HEALTH: ON AVERAGE, HOW MANY DAYS PER WEEK DO YOU ENGAGE IN MODERATE TO STRENUOUS EXERCISE (LIKE A BRISK WALK)?: 4 DAYS

## 2024-11-07 ASSESSMENT — SOCIAL DETERMINANTS OF HEALTH (SDOH): HOW OFTEN DO YOU GET TOGETHER WITH FRIENDS OR RELATIVES?: TWICE A WEEK

## 2024-11-08 ENCOUNTER — OFFICE VISIT (OUTPATIENT)
Dept: FAMILY MEDICINE | Facility: CLINIC | Age: 69
End: 2024-11-08
Payer: COMMERCIAL

## 2024-11-08 VITALS
SYSTOLIC BLOOD PRESSURE: 124 MMHG | HEIGHT: 67 IN | RESPIRATION RATE: 16 BRPM | OXYGEN SATURATION: 98 % | WEIGHT: 203.2 LBS | TEMPERATURE: 97.6 F | BODY MASS INDEX: 31.89 KG/M2 | DIASTOLIC BLOOD PRESSURE: 82 MMHG | HEART RATE: 61 BPM

## 2024-11-08 DIAGNOSIS — E55.9 VITAMIN D DEFICIENCY: ICD-10-CM

## 2024-11-08 DIAGNOSIS — I10 BENIGN ESSENTIAL HYPERTENSION: ICD-10-CM

## 2024-11-08 DIAGNOSIS — Z13.1 SCREENING FOR DIABETES MELLITUS: ICD-10-CM

## 2024-11-08 DIAGNOSIS — E78.5 HYPERLIPIDEMIA LDL GOAL <100: ICD-10-CM

## 2024-11-08 DIAGNOSIS — Z78.0 POST-MENOPAUSAL: ICD-10-CM

## 2024-11-08 DIAGNOSIS — E03.9 HYPOTHYROIDISM, UNSPECIFIED TYPE: ICD-10-CM

## 2024-11-08 DIAGNOSIS — Z12.31 VISIT FOR SCREENING MAMMOGRAM: ICD-10-CM

## 2024-11-08 DIAGNOSIS — Z86.19 HISTORY OF SHINGLES: ICD-10-CM

## 2024-11-08 DIAGNOSIS — Z00.00 ROUTINE GENERAL MEDICAL EXAMINATION AT A HEALTH CARE FACILITY: Primary | ICD-10-CM

## 2024-11-08 PROCEDURE — 99214 OFFICE O/P EST MOD 30 MIN: CPT | Mod: 25 | Performed by: FAMILY MEDICINE

## 2024-11-08 PROCEDURE — 99397 PER PM REEVAL EST PAT 65+ YR: CPT | Performed by: FAMILY MEDICINE

## 2024-11-08 RX ORDER — VALACYCLOVIR HYDROCHLORIDE 1 G/1
1000 TABLET, FILM COATED ORAL DAILY
Qty: 30 TABLET | Refills: 11 | Status: SHIPPED | OUTPATIENT
Start: 2024-11-08

## 2024-11-08 NOTE — PATIENT INSTRUCTIONS
Please call [phone] to schedule your colonoscopy.    Recommend Tetanus (Tdap) and Pneumococcal vaccines at pharmacy    Patient Education   Preventive Care Advice   This is general advice given by our system to help you stay healthy. However, your care team may have specific advice just for you. Please talk to your care team about your preventive care needs.  Nutrition  Eat 5 or more servings of fruits and vegetables each day.  Try wheat bread, brown rice and whole grain pasta (instead of white bread, rice, and pasta).  Get enough calcium and vitamin D. Check the label on foods and aim for 100% of the RDA (recommended daily allowance).  Lifestyle  Exercise at least 150 minutes each week  (30 minutes a day, 5 days a week).  Do muscle strengthening activities 2 days a week. These help control your weight and prevent disease.  No smoking.  Wear sunscreen to prevent skin cancer.  Have a dental exam and cleaning every 6 months.  Yearly exams  See your health care team every year to talk about:  Any changes in your health.  Any medicines your care team has prescribed.  Preventive care, family planning, and ways to prevent chronic diseases.  Shots (vaccines)   HPV shots (up to age 26), if you've never had them before.  Hepatitis B shots (up to age 59), if you've never had them before.  COVID-19 shot: Get this shot when it's due.  Flu shot: Get a flu shot every year.  Tetanus shot: Get a tetanus shot every 10 years.  Pneumococcal, hepatitis A, and RSV shots: Ask your care team if you need these based on your risk.  Shingles shot (for age 50 and up)  General health tests  Diabetes screening:  Starting at age 35, Get screened for diabetes at least every 3 years.  If you are younger than age 35, ask your care team if you should be screened for diabetes.  Cholesterol test: At age 39, start having a cholesterol test every 5 years, or more often if advised.  Bone density scan (DEXA): At age 50, ask your care team if you  should have this scan for osteoporosis (brittle bones).  Hepatitis C: Get tested at least once in your life.  STIs (sexually transmitted infections)  Before age 24: Ask your care team if you should be screened for STIs.  After age 24: Get screened for STIs if you're at risk. You are at risk for STIs (including HIV) if:  You are sexually active with more than one person.  You don't use condoms every time.  You or a partner was diagnosed with a sexually transmitted infection.  If you are at risk for HIV, ask about PrEP medicine to prevent HIV.  Get tested for HIV at least once in your life, whether you are at risk for HIV or not.  Cancer screening tests  Cervical cancer screening: If you have a cervix, begin getting regular cervical cancer screening tests starting at age 21.  Breast cancer scan (mammogram): If you've ever had breasts, begin having regular mammograms starting at age 40. This is a scan to check for breast cancer.  Colon cancer screening: It is important to start screening for colon cancer at age 45.  Have a colonoscopy test every 10 years (or more often if you're at risk) Or, ask your provider about stool tests like a FIT test every year or Cologuard test every 3 years.  To learn more about your testing options, visit:   .  For help making a decision, visit:   https://bit.ly/yq95802.  Prostate cancer screening test: If you have a prostate, ask your care team if a prostate cancer screening test (PSA) at age 55 is right for you.  Lung cancer screening: If you are a current or former smoker ages 50 to 80, ask your care team if ongoing lung cancer screenings are right for you.  For informational purposes only. Not to replace the advice of your health care provider. Copyright   2023 [employer]. All rights reserved. Clinically reviewed by the [facility] Transitions Program. [employer] [id number] - REV 01/24.  Bladder Training: Care Instructions  Your Care Instructions     Bladder training is  used to treat urge incontinence and stress incontinence. Urge incontinence means that the need to urinate comes on so fast that you can't get to a toilet in time. Stress incontinence means that you leak urine because of pressure on your bladder. For example, it may happen when you laugh, cough, or lift something heavy.  Bladder training can increase how long you can wait before you have to urinate. It can also help your bladder hold more urine. And it can give you better control over the urge to urinate.  It is important to remember that bladder training takes a few weeks to a few months to make a difference. You may not see results right away, but don't give up.  Follow-up care is a key part of your treatment and safety. Be sure to make and go to all appointments, and call your doctor if you are having problems. It's also a good idea to know your test results and keep a list of the medicines you take.  How can you care for yourself at home?  Work with your doctor to come up with a bladder training program that is right for you. You may use one or more of the following methods.  Delayed urination  In the beginning, try to keep from urinating for 5 minutes after you first feel the need to go.  While you wait, take deep, slow breaths to relax. Kegel exercises can also help you delay the need to go to the bathroom.  After some practice, when you can easily wait 5 minutes to urinate, try to wait 10 minutes before you urinate.  Slowly increase the waiting period until you are able to control when you have to urinate.  Scheduled urination  Empty your bladder when you first wake up in the morning.  Schedule times throughout the day when you will urinate.  Start by going to the bathroom every hour, even if you don't need to go.  Slowly increase the time between trips to the bathroom.  When you have found a schedule that works well for you, keep doing it.  If you wake up during the night and have to urinate, do it. Apply  "your schedule to waking hours only.  Kegel exercises  These tighten and strengthen pelvic muscles, which can help you control the flow of urine. (If doing these exercises causes pain, stop doing them and talk with your doctor.) To do Kegel exercises:  Squeeze your muscles as if you were trying not to pass gas. Or squeeze your muscles as if you were stopping the flow of urine. Your belly, legs, and buttocks shouldn't move.  Hold the squeeze for 3 seconds, then relax for 5 to 10 seconds.  Start with 3 seconds, then add 1 second each week until you are able to squeeze for 10 seconds.  Repeat the exercise 10 times a session. Do 3 to 8 sessions a day.  When should you call for help?  Watch closely for changes in your health, and be sure to contact your doctor if:    Your incontinence is getting worse.     You do not get better as expected.   Where can you learn more?  Go to https://www.[employer].net/patiented  Enter V684 in the search box to learn more about \"Bladder Training: Care Instructions.\"  Current as of: November 15, 2023  Content Version: 14.2 2024 [facility] [employer].   Care instructions adapted under license by your healthcare professional. If you have questions about a medical condition or this instruction, always ask your healthcare professional. [employer] disclaims any warranty or liability for your use of this information.       "

## 2024-11-08 NOTE — PROGRESS NOTES
"Preventive Care Visit  [facility]  April ALBA Chavez MD, Family Medicine  Nov 8, 2024      Assessment & Plan     Routine general medical examination at a health care facility  Exam completed today, routine health maintenance items updated as able.  Labs ordered.  Follow up one year or sooner as needed.  Recommend Tdap and Pneumococcal vaccines.  - Hemoglobin A1c; Future  - CBC with platelets; Future    Benign essential hypertension  Controlled, continue hydrochlorothiazide.  Refill when due.  - Comprehensive metabolic panel (BMP + Alb, Alk Phos, ALT, AST, Total. Bili, TP); Future    Hypothyroidism, unspecified type  Due for labs, recommendations pending results.  Refill Levothyroxine pending results.  - TSH with free T4 reflex; Future    Vitamin D deficiency  - Vitamin D Deficiency; Future    Hyperlipidemia LDL goal <100  - Lipid panel reflex to direct LDL Fasting; Future    History of shingles  Controlled, continue.  - valACYclovir (VALTREX) 1000 mg tablet; Take 1 tablet (1,000 mg) by mouth daily.    Screening for diabetes mellitus  - Hemoglobin A1c; Future    Post-menopausal  - DX Bone Density; Future    Visit for screening mammogram  - MA Screen Bilateral w/Shaheed; Future      BMI  Estimated body mass index is 31.83 kg/m  as calculated from the following:    Height as of this encounter: 1.702 m (5' 7\").    Weight as of this encounter: 92.2 kg (203 lb 3.2 oz).   Weight management plan: Discussed healthy diet and exercise guidelines    Counseling  Appropriate preventive services were addressed with this patient via screening, questionnaire, or discussion as appropriate for fall prevention, nutrition, physical activity, Tobacco-use cessation, social engagement, weight loss and cognition.  Checklist reviewing preventive services available has been given to the patient.  Reviewed patient's diet, addressing concerns and/or questions.   The patient was instructed to see the dentist every 6 " months.   Information on urinary incontinence and treatment options given to patient.       See Patient Instructions    Subjective   Clare is a 68 year old, presenting for the following:  Physical (Would like to get a breast exam. )        11/8/2024     2:53 PM   Additional Questions   Roomed by Stephany QUINTANA    Here for wellness.    She was happy with her weight today, was up to 237 lbs previously, goal is to get to 175 lbs.  She is walking daily.  Overall feeling well.    She needs to schedule a mammogram, and she occasionally has an ache in her left breast.  No sharp pains, fleeting aches. Never in the right breast that she can think of. Not often, happens once in a while.  Not in a focal area, might have pain on the inner side of the breast, sometimes in the center.  She is not concerned about it, but has been aware of it.    Of note, left hip bone spur present.  Given PT exercise.    Health Care Directive  Patient does not have a Health Care Directive: Discussed advance care planning with patient; however, patient declined at this time.      11/7/2024   General Health   How would you rate your overall physical health? Excellent   Feel stress (tense, anxious, or unable to sleep) Not at all            11/7/2024   Nutrition   Diet: Carbohydrate counting            11/7/2024   Exercise   Days per week of moderate/strenous exercise 4 days   Average minutes spent exercising at this level 30 min            11/7/2024   Social Factors   Frequency of gathering with friends or relatives Twice a week   Worry food won't last until get money to buy more No   Food not last or not have enough money for food? No   Do you have housing? (Housing is defined as stable permanent housing and does not include staying ouside in a car, in a tent, in an abandoned building, in an overnight shelter, or couch-surfing.) Yes   Are you worried about losing your housing? No   Lack of transportation? No   Unable to get utilities  (heat,electricity)? No            11/7/2024   Fall Risk   Fallen 2 or more times in the past year? No     No    Trouble with walking or balance? No     No        Patient-reported    Multiple values from one day are sorted in reverse-chronological order          11/7/2024   Activities of Daily Living- Home Safety   Needs help with the following daily activites None of the above   Safety concerns in the home None of the above            11/7/2024   Dental   Dentist two times every year? (!) NO            11/7/2024   Hearing Screening   Hearing concerns? None of the above            11/7/2024   Driving Risk Screening   Patient/family members have concerns about driving No            11/7/2024   General Alertness/Fatigue Screening   Have you been more tired than usual lately? No            11/7/2024   Urinary Incontinence Screening   Bothered by leaking urine in past 6 months Yes            11/7/2024   TB Screening   Were you born outside of the [place]? No            Today's PHQ-2 Score:       11/7/2024     7:17 PM   PHQ-2 ( 1999 Pfizer)   Q1: Little interest or pleasure in doing things 0    Q2: Feeling down, depressed or hopeless 0    PHQ-2 Score 0    Q1: Little interest or pleasure in doing things Not at all   Q2: Feeling down, depressed or hopeless Not at all   PHQ-2 Score 0       Patient-reported           11/7/2024   Substance Use   Alcohol more than 3/day or more than 7/wk No   Do you have a current opioid prescription? No   How severe/bad is pain from 1 to 10? 0/10 (No Pain)   Do you use any other substances recreationally? No        Social History     Tobacco Use    Smoking status: Never    Smokeless tobacco: Never   Vaping Use    Vaping status: Never Used   Substance Use Topics    Alcohol use: No    Drug use: No           11/24/2023   LAST FHS-7 RESULTS   1st degree relative breast or ovarian cancer No   Any relative bilateral breast cancer No   Any male have breast cancer No   Any ONE woman have BOTH breast AND  ovarian cancer No   Any woman with breast cancer before 50yrs No   2 or more relatives with breast AND/OR ovarian cancer No   2 or more relatives with breast AND/OR bowel cancer Yes           Mammogram Screening - Mammogram every 1-2 years updated in Health Maintenance based on mutual decision making      History of abnormal Pap smear: No - age 65 or older with adequate negative prior screening test results (3 consecutive negative cytology results, 2 consecutive negative cotesting results, or 2 consecutive negative HrHPV test results within 10 years, with the most recent test occurring within the recommended screening interval for the test used)        Latest Ref Rng & Units 10/13/2022    11:54 AM 9/23/2019    12:00 AM   PAP / HPV   PAP  Negative for Intraepithelial Lesion or Malignancy (NILM)     HPV 16 DNA Negative Negative     HPV 18 DNA Negative Negative     Other HR HPV Negative Negative     PAP-ABSTRACT   See Scanned Document           This result is from an external source.     ASCVD Risk   The 10-year ASCVD risk score (Seth BARTLETT, et al., 2019) is: 8.5%    Values used to calculate the score:      Age: 68 years      Sex: Female      Is Non- : No      Diabetic: No      Tobacco smoker: No      Systolic Blood Pressure: 124 mmHg      Is BP treated: Yes      HDL Cholesterol: 56 mg/dL      Total Cholesterol: 245 mg/dL          Reviewed and updated as needed this visit by Provider                    Past Medical History:   Diagnosis Date    Family history of colon cancer     brother    Family history of colonic polyps     mom    Hypothyroidism     Shingles      Past Surgical History:   Procedure Laterality Date    CHOLECYSTECTOMY      lap    COLONOSCOPY Left 6/21/2021    Procedure: COLONOSCOPY, WITH POLYPECTOMY AND BIOPSY; polypectomies using cold bx forcep and hot snare;  Surgeon: Germán Garcia MD;  Location:  GI     Lab work is in process  Labs reviewed in [facility]  BP Readings  from Last 3 Encounters:   24 124/82   23 135/80   22 110/72    Wt Readings from Last 3 Encounters:   24 92.2 kg (203 lb 3.2 oz)   23 100.1 kg (220 lb 9.6 oz)   10/13/22 102 kg (224 lb 12.8 oz)                  Patient Active Problem List   Diagnosis    Choroidal nevus of right eye    Genital herpes    Headache    Heart murmur    Hypothyroidism    Menopausal state    Migraine    Myopia of right eye    Presbyopia    Regular astigmatism of left eye    Tinnitus    CTS (carpal tunnel syndrome)    Shingles     Past Surgical History:   Procedure Laterality Date    CHOLECYSTECTOMY      lap    COLONOSCOPY Left 2021    Procedure: COLONOSCOPY, WITH POLYPECTOMY AND BIOPSY; polypectomies using cold bx forcep and hot snare;  Surgeon: Germán Garcia MD;  Location:  GI       Social History     Tobacco Use    Smoking status: Never    Smokeless tobacco: Never   Substance Use Topics    Alcohol use: No     Family History   Problem Relation Age of Onset    Breast Cancer Mother         Diagnosed in  at age 90; she  from complications after a fall in .    Hypertension Mother     Depression Mother         Diagnosed with depression when I was [age].    Hypereosinophilic syndrome Father 47    Colorectal Cancer Brother     Colon Cancer Brother          from the disease in  at age 69    Substance Abuse Brother         Meth addict; incarcerated for possession in early . I m not sure if he is completely clean--he works, etc., but I don t see him often enough to know.    Other Cancer Maternal Grandmother     Diabetes Paternal Grandfather             Glaucoma No family hx of     Macular Degeneration No family hx of          Current Outpatient Medications   Medication Sig Dispense Refill    hydrochlorothiazide (HYDRODIURIL) 12.5 MG tablet TAKE 1 TABLET BY MOUTH EVERY DAY 90 tablet 2    levothyroxine (SYNTHROID/LEVOTHROID) 125 MCG tablet Take 1 tablet (125 mcg) by mouth daily. 90  tablet 0    Omega 3-6-9 Fatty Acids (OMEGA 3-6-9 PO)       valACYclovir (VALTREX) 1000 mg tablet Take 1 tablet (1,000 mg) by mouth daily. 30 tablet 11     No Known Allergies  Recent Labs   Lab Test 11/10/23  1033 10/13/22  1216 05/19/21  0806 05/05/21  1002   A1C 5.5  --   --  5.3   * 171*  --  148*   HDL 56 63  --  57   TRIG 97 138  --  112   ALT  --  27  --  20   CR 0.71 0.80 0.87 0.79   GFRESTIMATED >90 82 70 79   GFRESTBLACK  --   --  82 >90   POTASSIUM 4.5 4.2 3.5 4.0   TSH 0.34 4.84*  --  2.54      Current providers sharing in care for this patient include:  Patient Care Team:  Gi Marion MD as PCP - General  Belkis Jin OD as MD (Ophthalmology)  Belkis Jin OD as Assigned Surgical Provider  Gi Marion MD as Assigned PCP  Aiden Carr DO as Assigned Musculoskeletal Provider    The following health maintenance items are reviewed in Epic and correct as of today:  Health Maintenance   Topic Date Due    DEXA  Never done    DTAP/TDAP/TD IMMUNIZATION (1 - Tdap) Never done    Pneumococcal Vaccine: 65+ Years (1 of 1 - PCV) Never done    COLORECTAL CANCER SCREENING  06/21/2024    BMP  11/10/2024    TSH W/FREE T4 REFLEX  11/10/2024    MEDICARE ANNUAL WELLNESS VISIT  11/08/2025    ANNUAL REVIEW OF HM ORDERS  11/08/2025    FALL RISK ASSESSMENT  11/08/2025    MAMMO SCREENING  11/24/2025    GLUCOSE  11/10/2026    LIPID  11/10/2028    ADVANCE CARE PLANNING  11/08/2029    RSV VACCINE (1 - 1-dose 75+ series) 12/15/2032    HEPATITIS C SCREENING  Completed    PHQ-2 (once per calendar year)  Completed    INFLUENZA VACCINE  Completed    ZOSTER IMMUNIZATION  Completed    COVID-19 Vaccine  Completed    HPV IMMUNIZATION  Aged Out    MENINGITIS IMMUNIZATION  Aged Out    RSV MONOCLONAL ANTIBODY  Aged Out    PAP  Discontinued          Objective    Exam  /82 (BP Location: Right arm, Patient Position: Sitting, Cuff Size: Adult Regular)   Pulse 61   Temp 97.6  F  "(36.4  C) (Oral)   Resp 16   Ht 1.702 m (5' 7\")   Wt 92.2 kg (203 lb 3.2 oz)   SpO2 98%   BMI 31.83 kg/m     Estimated body mass index is 31.83 kg/m  as calculated from the following:    Height as of this encounter: 1.702 m (5' 7\").    Weight as of this encounter: 92.2 kg (203 lb 3.2 oz).    Physical Exam  GENERAL: alert and no distress  EYES: Eyes grossly normal to inspection, PERRL and conjunctivae and sclerae normal  HENT: ear canals and TM's normal, nose and mouth without ulcers or lesions  NECK: no adenopathy, no asymmetry, masses, or scars  RESP: lungs clear to auscultation - no rales, rhonchi or wheezes  BREAST: normal without masses, tenderness or nipple discharge and no palpable axillary masses or adenopathy  CV: regular rate and rhythm, normal S1 S2, no S3 or S4, no murmur, click or rub, no peripheral edema  ABDOMEN: bowel sounds normal  MS: no gross musculoskeletal defects noted, no edema  SKIN: no suspicious lesions or rashes  NEURO: Normal strength and tone, mentation intact and speech normal  PSYCH: mentation appears normal, affect normal/bright        11/8/2024   Mini Cog   Clock Draw Score 2 Normal   3 Item Recall 3 objects recalled   Mini Cog Total Score 5                Signed Electronically by: Gi Chavez MD    "

## 2024-11-12 ENCOUNTER — ANCILLARY PROCEDURE (OUTPATIENT)
Dept: MAMMOGRAPHY | Facility: CLINIC | Age: 69
End: 2024-11-12
Attending: FAMILY MEDICINE
Payer: COMMERCIAL

## 2024-11-12 DIAGNOSIS — Z12.31 VISIT FOR SCREENING MAMMOGRAM: ICD-10-CM

## 2024-11-12 PROCEDURE — 77063 BREAST TOMOSYNTHESIS BI: CPT | Mod: TC | Performed by: RADIOLOGY

## 2024-11-12 PROCEDURE — 77067 SCR MAMMO BI INCL CAD: CPT | Mod: TC | Performed by: RADIOLOGY

## 2024-11-15 ENCOUNTER — LAB (OUTPATIENT)
Dept: LAB | Facility: CLINIC | Age: 69
End: 2024-11-15
Payer: COMMERCIAL

## 2024-11-15 DIAGNOSIS — E03.9 HYPOTHYROIDISM, UNSPECIFIED TYPE: ICD-10-CM

## 2024-11-15 DIAGNOSIS — I10 BENIGN ESSENTIAL HYPERTENSION: ICD-10-CM

## 2024-11-15 DIAGNOSIS — Z13.1 SCREENING FOR DIABETES MELLITUS: ICD-10-CM

## 2024-11-15 DIAGNOSIS — Z00.00 ROUTINE GENERAL MEDICAL EXAMINATION AT A HEALTH CARE FACILITY: ICD-10-CM

## 2024-11-15 DIAGNOSIS — E78.5 HYPERLIPIDEMIA LDL GOAL <100: ICD-10-CM

## 2024-11-15 DIAGNOSIS — E55.9 VITAMIN D DEFICIENCY: ICD-10-CM

## 2024-11-15 LAB
ALBUMIN SERPL BCG-MCNC: 4.1 G/DL (ref 3.5–5.2)
ALP SERPL-CCNC: 122 U/L (ref 40–150)
ALT SERPL W P-5'-P-CCNC: 12 U/L (ref 0–50)
ANION GAP SERPL CALCULATED.3IONS-SCNC: 7 MMOL/L (ref 7–15)
AST SERPL W P-5'-P-CCNC: 17 U/L (ref 0–45)
BILIRUB SERPL-MCNC: 0.5 MG/DL
BUN SERPL-MCNC: 8.2 MG/DL (ref 8–23)
CALCIUM SERPL-MCNC: 10.7 MG/DL (ref 8.8–10.4)
CHLORIDE SERPL-SCNC: 103 MMOL/L (ref 98–107)
CHOLEST SERPL-MCNC: 258 MG/DL
CREAT SERPL-MCNC: 0.73 MG/DL (ref 0.51–0.95)
EGFRCR SERPLBLD CKD-EPI 2021: 90 ML/MIN/1.73M2
ERYTHROCYTE [DISTWIDTH] IN BLOOD BY AUTOMATED COUNT: 12.2 % (ref 10–15)
EST. AVERAGE GLUCOSE BLD GHB EST-MCNC: 108 MG/DL
FASTING STATUS PATIENT QL REPORTED: YES
FASTING STATUS PATIENT QL REPORTED: YES
GLUCOSE SERPL-MCNC: 93 MG/DL (ref 70–99)
HBA1C MFR BLD: 5.4 % (ref 0–5.6)
HCO3 SERPL-SCNC: 27 MMOL/L (ref 22–29)
HCT VFR BLD AUTO: 41.2 % (ref 35–47)
HDLC SERPL-MCNC: 60 MG/DL
HGB BLD-MCNC: 14.1 G/DL (ref 11.7–15.7)
LDLC SERPL CALC-MCNC: 180 MG/DL
MCH RBC QN AUTO: 31.6 PG (ref 26.5–33)
MCHC RBC AUTO-ENTMCNC: 34.2 G/DL (ref 31.5–36.5)
MCV RBC AUTO: 92 FL (ref 78–100)
NONHDLC SERPL-MCNC: 198 MG/DL
PLATELET # BLD AUTO: 276 10E3/UL (ref 150–450)
POTASSIUM SERPL-SCNC: 4.4 MMOL/L (ref 3.4–5.3)
PROT SERPL-MCNC: 6.9 G/DL (ref 6.4–8.3)
RBC # BLD AUTO: 4.46 10E6/UL (ref 3.8–5.2)
SODIUM SERPL-SCNC: 137 MMOL/L (ref 135–145)
TRIGL SERPL-MCNC: 89 MG/DL
TSH SERPL DL<=0.005 MIU/L-ACNC: 1.57 UIU/ML (ref 0.3–4.2)
VIT D+METAB SERPL-MCNC: 16 NG/ML (ref 20–50)
WBC # BLD AUTO: 5.2 10E3/UL (ref 4–11)

## 2024-11-19 ENCOUNTER — MYC MEDICAL ADVICE (OUTPATIENT)
Dept: FAMILY MEDICINE | Facility: CLINIC | Age: 69
End: 2024-11-19
Payer: COMMERCIAL

## 2024-11-19 DIAGNOSIS — E55.9 VITAMIN D DEFICIENCY: Primary | ICD-10-CM

## 2024-11-19 RX ORDER — ERGOCALCIFEROL 1.25 MG/1
50000 CAPSULE, LIQUID FILLED ORAL WEEKLY
Qty: 12 CAPSULE | Refills: 0 | Status: SHIPPED | OUTPATIENT
Start: 2024-11-19 | End: 2025-02-05

## 2024-11-24 DIAGNOSIS — Z86.19 HISTORY OF SHINGLES: ICD-10-CM

## 2024-11-25 RX ORDER — VALACYCLOVIR HYDROCHLORIDE 1 G/1
1000 TABLET, FILM COATED ORAL 2 TIMES DAILY
Qty: 180 TABLET | Refills: 3 | OUTPATIENT
Start: 2024-11-25

## 2025-01-14 DIAGNOSIS — E03.9 HYPOTHYROIDISM, UNSPECIFIED TYPE: ICD-10-CM

## 2025-01-14 DIAGNOSIS — I10 BENIGN ESSENTIAL HYPERTENSION: ICD-10-CM

## 2025-01-14 RX ORDER — LEVOTHYROXINE SODIUM 125 UG/1
125 TABLET ORAL DAILY
Qty: 90 TABLET | Refills: 2 | Status: SHIPPED | OUTPATIENT
Start: 2025-01-14

## 2025-01-14 RX ORDER — HYDROCHLOROTHIAZIDE 12.5 MG/1
12.5 TABLET ORAL DAILY
Qty: 90 TABLET | Refills: 2 | Status: SHIPPED | OUTPATIENT
Start: 2025-01-14

## 2025-02-25 ENCOUNTER — OFFICE VISIT (OUTPATIENT)
Dept: PEDIATRICS | Facility: CLINIC | Age: 70
End: 2025-02-25
Payer: COMMERCIAL

## 2025-02-25 ENCOUNTER — NURSE TRIAGE (OUTPATIENT)
Dept: FAMILY MEDICINE | Facility: CLINIC | Age: 70
End: 2025-02-25

## 2025-02-25 ENCOUNTER — ANCILLARY PROCEDURE (OUTPATIENT)
Dept: GENERAL RADIOLOGY | Facility: CLINIC | Age: 70
End: 2025-02-25
Attending: EMERGENCY MEDICINE
Payer: COMMERCIAL

## 2025-02-25 VITALS
BODY MASS INDEX: 31.83 KG/M2 | HEART RATE: 63 BPM | OXYGEN SATURATION: 96 % | TEMPERATURE: 97.3 F | HEIGHT: 67 IN | DIASTOLIC BLOOD PRESSURE: 82 MMHG | SYSTOLIC BLOOD PRESSURE: 130 MMHG | RESPIRATION RATE: 16 BRPM

## 2025-02-25 DIAGNOSIS — R07.9 CHEST PAIN, UNSPECIFIED TYPE: Primary | ICD-10-CM

## 2025-02-25 LAB
ANION GAP SERPL CALCULATED.3IONS-SCNC: 15 MMOL/L (ref 3–14)
BUN SERPL-MCNC: 11 MG/DL (ref 7–30)
CALCIUM SERPL-MCNC: 10.9 MG/DL (ref 8.5–10.1)
CHLORIDE BLD-SCNC: 98 MMOL/L (ref 94–109)
CO2 SERPL-SCNC: 28 MMOL/L (ref 20–32)
CREAT SERPL-MCNC: 0.8 MG/DL (ref 0.52–1.04)
D DIMER PPP FEU-MCNC: <0.27 UG/ML FEU (ref 0–0.5)
EGFRCR SERPLBLD CKD-EPI 2021: 80 ML/MIN/1.73M2
ERYTHROCYTE [DISTWIDTH] IN BLOOD BY AUTOMATED COUNT: 11.7 % (ref 10–15)
GLUCOSE BLD-MCNC: 95 MG/DL (ref 70–99)
HCT VFR BLD AUTO: 41.7 % (ref 35–47)
HGB BLD-MCNC: 14.2 G/DL (ref 11.7–15.7)
MCH RBC QN AUTO: 31.4 PG (ref 26.5–33)
MCHC RBC AUTO-ENTMCNC: 34.1 G/DL (ref 31.5–36.5)
MCV RBC AUTO: 92 FL (ref 78–100)
PLATELET # BLD AUTO: 246 10E3/UL (ref 150–450)
POTASSIUM BLD-SCNC: 3.8 MMOL/L (ref 3.4–5.3)
RBC # BLD AUTO: 4.52 10E6/UL (ref 3.8–5.2)
SODIUM SERPL-SCNC: 141 MMOL/L (ref 135–145)
TROPONIN T SERPL HS-MCNC: <6 NG/L
TROPONIN T SERPL HS-MCNC: <6 NG/L
WBC # BLD AUTO: 6.5 10E3/UL (ref 4–11)

## 2025-02-25 PROCEDURE — 85379 FIBRIN DEGRADATION QUANT: CPT | Performed by: EMERGENCY MEDICINE

## 2025-02-25 PROCEDURE — 85027 COMPLETE CBC AUTOMATED: CPT | Performed by: EMERGENCY MEDICINE

## 2025-02-25 PROCEDURE — 99215 OFFICE O/P EST HI 40 MIN: CPT | Performed by: EMERGENCY MEDICINE

## 2025-02-25 PROCEDURE — 99417 PROLNG OP E/M EACH 15 MIN: CPT | Performed by: EMERGENCY MEDICINE

## 2025-02-25 PROCEDURE — 71046 X-RAY EXAM CHEST 2 VIEWS: CPT | Mod: TC | Performed by: RADIOLOGY

## 2025-02-25 PROCEDURE — 84484 ASSAY OF TROPONIN QUANT: CPT | Performed by: EMERGENCY MEDICINE

## 2025-02-25 PROCEDURE — 93005 ELECTROCARDIOGRAM TRACING: CPT | Performed by: EMERGENCY MEDICINE

## 2025-02-25 PROCEDURE — 80048 BASIC METABOLIC PNL TOTAL CA: CPT | Performed by: EMERGENCY MEDICINE

## 2025-02-25 NOTE — PROGRESS NOTES
Acute [facility] Diagnostic Services Clinic Visit    Assessment & Plan     Chest pain, unspecified type  2 days of intermittent exertional chest pain. Today she says she has a slight sensation still in her mid chest but not pain.  This has been there constantly today.  Epic review shows most recent cholesterol of 258, triglycerides 89, hdl 60, ldl 180, non hdl 198. Ecg today is normal without ischemic changes.  cbc and bmp normal. Cxr negative.  D dimer less than 0.27. Troponin was less than 6 x 2.  I discussed her case with the cardiologist on call who felt she needed outpatient stress test.  Despite her normal troponins, she has hyperlipidemia not on a statin and her story is concerning.  We discussed admission vs outpatient stress test and I felt admission was a better choice. She would like to go home and do outpatient stress test and cards referral. I placed an emergent cards referral and order and outpatient stress test.  We discussed taking a daily baby aspirin.  We discussed no exercise until she sees cardiology. And we also discussed going to the ER promptly if her symptoms worsen in any way.  She was discharged home per her request.   - Troponin T, High Sensitivity  - CBC with platelets  - EKG 12-lead, tracing only  - XR Chest 2 Views  - Basic metabolic panel      65 minutes spent by me on the date of the encounter doing chart review, review of test results, interpretation of tests, patient visit, documentation, and discussion with other provider(s)         Patient Instructions   Take a daily 81 mg aspirin daily until you see cardiology.     Follow up with cardiology as soon as possible.     Do not exercise until you see cardiology.  If your chest pain starts to happen with less exertion or you develop new shortness of breath, go directly to the emergency room.     A referral for a stress test has been placed.  They should call you to schedule.     No follow-ups on file.    Addy Sparks is a 69 year old,  presenting for the following health issues:  No chief complaint on file.    The patient is a 68 yo female with hx of hyperlipidemia off statin, heart murmur, hypothyroidism who presents to the [facility] for exertional chest pain for 2 days. On Sunday she was walking up a hill and felt chest tightness that resolved when she got to the top and rested.  Yesterday she was on her treadmill which she does frequently and developed the same mid chest tightness which resolves with rest.  She has never had this before.  Denies back pain, jaw or arm pain.  Denies recent viral symptoms or cough.  No nausea.  No sob.  She got a bit sweaty but was working out.  She denies hx of dm, hypertension, smoking.  No family hx of heart disease. No recent travel or calf pain.              Chest Pain  Onset/Duration: sunday  Description:   Location: middle  Character: achey and tight  Radiation: n/a  Duration: only with exercise   Intensity: 6/10  Progression of Symptoms: same - its just there  Accompanying Signs & Symptoms:  Shortness of breath: No  Sweating: No  Nausea/vomiting: No  Lightheadedness: No  Palpitations: No  Fever/Chills: No  Cough: No           Heartburn: No  History:   Family history of heart disease: No  Tobacco use: No  Previous similar symptoms: no   Precipitating factors:   Worse with exertion: YES - exercise  Worse with deep breaths: No           Related to eating: No           Better with burping: No  Alleviating factors: stopping exercise  Therapies tried and outcome: nothing            Objective    There were no vitals taken for this visit.  There is no height or weight on file to calculate BMI.  Physical Exam  Vitals and nursing note reviewed.   Constitutional:       General: She is not in acute distress.     Appearance: Normal appearance. She is not ill-appearing, toxic-appearing or diaphoretic.   HENT:      Head: Normocephalic and atraumatic.      Nose: Nose normal.   Eyes:      General: No scleral icterus.      Extraocular Movements: Extraocular movements intact.   Cardiovascular:      Rate and Rhythm: Normal rate and regular rhythm.      Heart sounds: Normal heart sounds.   Pulmonary:      Effort: Pulmonary effort is normal.      Breath sounds: Normal breath sounds.   Abdominal:      General: Abdomen is flat. Bowel sounds are normal. There is no distension.      Palpations: Abdomen is soft. There is no mass.      Tenderness: There is no abdominal tenderness. There is no guarding or rebound.      Hernia: No hernia is present.   Musculoskeletal:         General: No swelling, tenderness, deformity or signs of injury. Normal range of motion.      Cervical back: Normal range of motion.      Right lower leg: No edema.      Left lower leg: No edema.   Skin:     General: Skin is warm and dry.   Neurological:      General: No focal deficit present.      Mental Status: She is alert and oriented to person, place, and time.   Psychiatric:         Mood and Affect: Mood normal.            Results for orders placed or performed in visit on 02/25/25 (from the past 24 hours)   XR Chest 2 Views    Narrative    EXAM: XR CHEST 2 VIEWS  LOCATION: [facility]  DATE: 2/25/2025    INDICATION: chest pain  COMPARISON: None.      Impression    IMPRESSION: Negative chest.   Troponin T, High Sensitivity   Result Value Ref Range    Troponin T, High Sensitivity <6 <=14 ng/L   CBC with platelets   Result Value Ref Range    WBC Count 6.5 4.0 - 11.0 10e3/uL    RBC Count 4.52 3.80 - 5.20 10e6/uL    Hemoglobin 14.2 11.7 - 15.7 g/dL    Hematocrit 41.7 35.0 - 47.0 %    MCV 92 78 - 100 fL    MCH 31.4 26.5 - 33.0 pg    MCHC 34.1 31.5 - 36.5 g/dL    RDW 11.7 10.0 - 15.0 %    Platelet Count 246 150 - 450 10e3/uL   Basic metabolic panel   Result Value Ref Range    Sodium 141 135 - 145 mmol/L    Potassium 3.8 3.4 - 5.3 mmol/L    Chloride 98 94 - 109 mmol/L    Carbon Dioxide (CO2) 28 20 - 32 mmol/L    Anion Gap 15 (H) 3 - 14 mmol/L    Urea  Nitrogen 11 7 - 30 mg/dL    Creatinine 0.80 0.52 - 1.04 mg/dL    GFR Estimate 80 >60 mL/min/1.73m2    Calcium 10.9 (H) 8.5 - 10.1 mg/dL    Glucose 95 70 - 99 mg/dL   Troponin T, High Sensitivity   Result Value Ref Range    Troponin T, High Sensitivity <6 <=14 ng/L   D dimer quantitative   Result Value Ref Range    D-Dimer Quantitative <0.27 0.00 - 0.50 ug/mL FEU    Narrative    This D-dimer assay is intended for use in conjunction with a clinical pretest probability assessment model to exclude pulmonary embolism (PE) and deep venous thrombosis (DVT) in outpatients suspected of PE or DVT. The cut-off value is 0.50 ug/mL FEU.    For patients 50 years of age or older, the application of age-adjusted cut-off values for D-Dimer may increase the specificity without significant effect on sensitivity. The literature suggested calculation age adjusted cut-off in ug/L = age in years x 10 ug/L. The results in this laboratory are reported as ug/mL rather than ug/L. The calculation for age adjusted cut off in ug/mL= age in years x 0.01 ug/mL. For example, the cut off for a 76 year old male is 76 x 0.01 ug/mL = 0.76 ug/mL (760 ug/L).    M Deepthi et al. Age adjusted D-dimer cut-off levels to rule out pulmonary embolism: The ADJUST-PE Study. GILDARDO 2014;311:1752-7460.; HJ Lencho et al. Diagnostic accuracy of conventional or age adjusted D-dimer cutoff values in older patients with suspected venous thromboembolism. Systemic review and meta-analysis. BMJ 2013:346:f2492.           Signed Electronically by: Francesca Dunbar MD

## 2025-02-25 NOTE — TELEPHONE ENCOUNTER
Left message to return call to clinic and ask to speak with any triage nurse.   -Mychart message sent.    REYNOLD AcevesN, RN  [facility]

## 2025-02-25 NOTE — TELEPHONE ENCOUNTER
Could we check and see if [facility] would see her? I think she should have troponin level checked and we can't get that back here today. Urgent care would be an option too but not sure if they can get labs back right away either.     Ian Jiménez PA-C on 2/25/2025 at 12:04 PM

## 2025-02-25 NOTE — TELEPHONE ENCOUNTER
Reason for Disposition    Chest pain lasting longer than 5 minutes and occurred in last 3 days (72 hours) (Exception: Feels exactly the same as previously diagnosed heartburn and has accompanying sour taste in mouth.)    Additional Information    Negative: SEVERE difficulty breathing (e.g., struggling for each breath, speaks in single words)    Negative: Difficult to awaken or acting confused (e.g., disoriented, slurred speech)    Negative: Shock suspected (e.g., cold/pale/clammy skin, too weak to stand, low BP, rapid pulse)    Negative: Passed out (i.e., lost consciousness, collapsed and was not responding)    Negative: Chest pain lasting longer than 5 minutes and ANY of the following:          Pain is crushing, pressure-like, or heavy          Over 44 years old           Over 30 years old and one cardiac risk factor (e.g diabetes, high blood pressure, high cholesterol, smoker, or family history of heart disease)          History of heart disease (e.g. angina, heart attack, heart failure, bypass surgery, takes nitroglycerin)    Negative: Heart beating < 50 beats per minute OR > 140 beats per minute    Negative: Visible sweat on face or sweat dripping down face    Negative: Sounds like a life-threatening emergency to the triager    Negative: Followed an injury to chest    Negative: SEVERE chest pain    Negative: Pain also in shoulder(s) or arm(s) or jaw    Negative: Difficulty breathing    Negative: Cocaine use within last 3 days    Negative: Major surgery in the past month    Negative: Hip or leg fracture (broken bone) in past month (or had cast on leg or ankle in past month)    Negative: Illness requiring prolonged bedrest in past month (e.g., immobilization, long hospital stay)    Negative: Long-distance travel in past month (e.g., car, bus, train, plane; with trip lasting 6 or more hours)    Negative: History of prior 'blood clot' in leg or lungs (i.e., deep vein thrombosis, pulmonary embolism)    Negative:  "History of inherited increased risk of blood clots (e.g., Factor 5 Leiden, Anti-thrombin 3, Protein C or Protein S deficiency, Prothrombin mutation)    Negative: Cancer treatment in the past two months (or has cancer now)    Negative: Heart beating irregularly or very rapidly    Answer Assessment - Initial Assessment Questions  1. LOCATION: \"Where does it hurt?\"        Right in the center   2. RADIATION: \"Does the pain go anywhere else?\" (e.g., into neck, jaw, arms, back)      No   3. ONSET: \"When did the chest pain begin?\" (Minutes, hours or days)       Sunday  4. PATTERN: \"Does the pain come and go, or has it been constant since it started?\"  \"Does it get worse with exertion?\"       Comes and goes, happened on a hill while walking her dog and on treadmill on incline   5. DURATION: \"How long does it last\" (e.g., seconds, minutes, hours)      On road til got to top of hill and rested and pain went away when rested and did 23-25 minutes on treadmill and when stopped pain stopped   6. SEVERITY: \"How bad is the pain?\"  (e.g., Scale 1-10; mild, moderate, or severe)     - MILD (1-3): doesn't interfere with normal activities      - MODERATE (4-7): interferes with normal activities or awakens from sleep     - SEVERE (8-10): excruciating pain, unable to do any normal activities        Moderate   7. CARDIAC RISK FACTORS: \"Do you have any history of heart problems or risk factors for heart disease?\" (e.g., angina, prior heart attack; diabetes, high blood pressure, high cholesterol, smoker, or strong family history of heart disease)      Last cholesterol a little high   8. PULMONARY RISK FACTORS: \"Do you have any history of lung disease?\"  (e.g., blood clots in lung, asthma, emphysema, birth control pills)      No   9. CAUSE: \"What do you think is causing the chest pain?\"      Unknown   10. OTHER SYMPTOMS: \"Do you have any other symptoms?\" (e.g., dizziness, nausea, vomiting, sweating, fever, difficulty breathing, cough)        " "No   11. PREGNANCY: \"Is there any chance you are pregnant?\" \"When was your last menstrual period?\"        N/a    Protocols used: Chest Pain-A-[place]    "

## 2025-02-25 NOTE — TELEPHONE ENCOUNTER
Called [facility]. They will review and Teams this RN back to inform if pt can be seen today.    Called [facility] back as no one has responded via Teams. They inform they are completely booked for today and recommend reaching out to [facility] (phone number [phone]).    Called [facility]. They accepted pt for today and got her scheduled as well as cancelling her appointment with Ian Jiménez PA-C for today.    Pt is aware of appointment per Jose at [facility]. No further action required.    Nery NGUYEN RN

## 2025-02-25 NOTE — PATIENT INSTRUCTIONS
Take a daily 81 mg aspirin daily until you see cardiology.     Follow up with cardiology as soon as possible.     Do not exercise until you see cardiology.  If your chest pain starts to happen with less exertion or you develop new shortness of breath, go directly to the emergency room.     A referral for a stress test has been placed.  They should call you to schedule.

## 2025-02-25 NOTE — TELEPHONE ENCOUNTER
Johnathon CALLOWAY scheduled her at 2:10 pm.  OK for OV.  She had episode Sunday and Monday with incline of mid chest pain.  Pain stops when activity stopped.  OK for clinic or do you want ER or UC.  States can only come in afternoon.  Please advise.  Advised patient we would only call if provider advised ER or UC.  Leidy Sanchez RN

## 2025-02-26 NOTE — TELEPHONE ENCOUNTER
RN called and spoke with patient-    Relayed message from Dr. Karin Chavez (see previous note). Patient reports she is feeling better today and does not feel that ED is necessary at this time. Chest pressure is still slightly present, but less than yesterday. She picked-up baby aspirin and began taking yesterday. RN reviewed recommendation to avoid exercise until patient sees Cardiology - per [facility] provider. Discussed red flags and when to present to ED. Instructed to call back [phone] and ask to speak with a nurse if she has further questions/concerns.     Patient was given an opportunity to ask questions, verbalized understanding of plan, and is agreeable.     Bella LEVIN RN  [facility]

## 2025-02-26 NOTE — TELEPHONE ENCOUNTER
Dr. Karin Chavez    See [facility], please advise on recommendation. Okay to wait until 3/3 to be seen by cardiology?   Patient unable to be seen by cardiology until 3/3/25, stress test scheduled for 3/10  -Patient feels this is too long to wait.     Emergent cardiology referral sent yesterday at [facility] visit.    Per visit note 2/26/25:   Chest pain, unspecified type  2 days of intermittent exertional chest pain. Today she says she has a slight sensation still in her mid chest but not pain.  This has been there constantly today.  Epic review shows most recent cholesterol of 258, triglycerides 89, hdl 60, ldl 180, non hdl 198. Ecg today is normal without ischemic changes.  cbc and bmp normal. Cxr negative.  D dimer less than 0.27. Troponin was less than 6 x 2.  I discussed her case with the cardiologist on call who felt she needed outpatient stress test.  Despite her normal troponins, she has hyperlipidemia not on a statin and her story is concerning.  We discussed admission vs outpatient stress test and I felt admission was a better choice. She would like to go home and do outpatient stress test and cards referral. I placed an emergent cards referral and order and outpatient stress test.  We discussed taking a daily baby aspirin.  We discussed no exercise until she sees cardiology. And we also discussed going to the ER promptly if her symptoms worsen in any way.  She was discharged home per her request.   - Troponin T, High Sensitivity  - CBC with platelets  - EKG 12-lead, tracing only  - XR Chest 2 Views  - Basic metabolic panel    JIM Aceves, RN  [facility]

## 2025-03-03 ENCOUNTER — OFFICE VISIT (OUTPATIENT)
Dept: CARDIOLOGY | Facility: CLINIC | Age: 70
End: 2025-03-03
Attending: EMERGENCY MEDICINE
Payer: COMMERCIAL

## 2025-03-03 ENCOUNTER — LAB (OUTPATIENT)
Dept: LAB | Facility: CLINIC | Age: 70
End: 2025-03-03
Payer: COMMERCIAL

## 2025-03-03 VITALS
BODY MASS INDEX: 32.72 KG/M2 | HEIGHT: 67 IN | WEIGHT: 208.5 LBS | HEART RATE: 76 BPM | DIASTOLIC BLOOD PRESSURE: 78 MMHG | SYSTOLIC BLOOD PRESSURE: 133 MMHG

## 2025-03-03 DIAGNOSIS — R07.9 CHEST PAIN, UNSPECIFIED TYPE: ICD-10-CM

## 2025-03-03 LAB
APO A-I SERPL-MCNC: 170 MG/DL
TROPONIN T SERPL HS-MCNC: <6 NG/L

## 2025-03-03 PROCEDURE — 36415 COLL VENOUS BLD VENIPUNCTURE: CPT | Performed by: INTERNAL MEDICINE

## 2025-03-03 PROCEDURE — 93000 ELECTROCARDIOGRAM COMPLETE: CPT | Performed by: INTERNAL MEDICINE

## 2025-03-03 PROCEDURE — 83695 ASSAY OF LIPOPROTEIN(A): CPT | Performed by: INTERNAL MEDICINE

## 2025-03-03 PROCEDURE — 84484 ASSAY OF TROPONIN QUANT: CPT | Performed by: INTERNAL MEDICINE

## 2025-03-03 PROCEDURE — G2211 COMPLEX E/M VISIT ADD ON: HCPCS | Performed by: INTERNAL MEDICINE

## 2025-03-03 PROCEDURE — 99205 OFFICE O/P NEW HI 60 MIN: CPT | Performed by: INTERNAL MEDICINE

## 2025-03-03 RX ORDER — ROSUVASTATIN CALCIUM 20 MG/1
20 TABLET, COATED ORAL DAILY
Qty: 90 TABLET | Refills: 3 | Status: SHIPPED | OUTPATIENT
Start: 2025-03-03

## 2025-03-03 RX ORDER — METOPROLOL SUCCINATE 25 MG/1
50 TABLET, EXTENDED RELEASE ORAL DAILY
Qty: 30 TABLET | Refills: 3 | Status: SHIPPED | OUTPATIENT
Start: 2025-03-03

## 2025-03-03 RX ORDER — ASPIRIN 81 MG/1
81 TABLET ORAL DAILY
COMMUNITY

## 2025-03-03 RX ORDER — NITROGLYCERIN 0.4 MG/1
TABLET SUBLINGUAL
Qty: 30 TABLET | Refills: 3 | Status: SHIPPED | OUTPATIENT
Start: 2025-03-03

## 2025-03-03 NOTE — LETTER
3/3/2025    Gi Chavez MD  [id number] [address] [facility] [id number]    RE: Clare Wallace       Dear Colleague,     I had the pleasure of seeing Clare Wallace in the [facility].  CARDIOLOGY CLINIC CONSULTATION    PRIMARY CARE PHYSICIAN:  Gi Chavez    HISTORY OF PRESENT ILLNESS:  This is a very pleasant 69-year-old female denies any known prior cardiovascular history.  She has a history of hypertension and takes low-dose of hydrochlorothiazide for it.  She has a history of hyperlipidemia but not on any treatment.  Her last 3 LDLs have been in the 1 70-1 80 range.  No family history of coronary disease.  No diabetes.  She is mildly obese with a BMI of 33.  No diabetes.  Creatinine potassium sodium levels are normal otherwise.    Recently she went to the urgent care because she was experiencing chest discomfort while working out on the treadmill.  On 2/25/2025, she underwent an ECG that showed normal sinus rhythm with no significant ST-T changes.    However after that she had 2 more episodes of similar exertional chest discomfort.  The patient is being seen urgently in the cardiology clinic for these ongoing symptoms.      Today the patient denies any resting symptoms.  However she is worried and feels that now she is being more attention to constant chest heaviness that has been going on since this event.  She underwent another repeat ECG today in clinic which does not show any significant changes compared to prior.  We ordered another troponin in the clinic which is currently pending.    No syncope presyncope bleeding problems.  Functionally very active otherwise.    PAST MEDICAL HISTORY:  Past Medical History:   Diagnosis Date     Family history of colon cancer     brother     Family history of colonic polyps     mom     Hypothyroidism      Shingles        MEDICATIONS:  Current Outpatient Medications   Medication Sig Dispense Refill     aspirin 81 MG EC tablet Take 81 mg by  mouth daily.       hydroCHLOROthiazide 12.5 MG tablet TAKE 1 TABLET BY MOUTH EVERY DAY 90 tablet 2     levothyroxine (SYNTHROID/LEVOTHROID) 125 MCG tablet TAKE 1 TABLET BY MOUTH EVERY DAY 90 tablet 2     metoprolol succinate ER (TOPROL XL) 25 MG 24 hr tablet Take 2 tablets (50 mg) by mouth daily. 30 tablet 3     nitroGLYcerin (NITROSTAT) 0.4 MG sublingual tablet For chest pain place 1 tablet under the tongue every 5 minutes for 3 doses. If symptoms persist 5 minutes after 1st dose call 911. 30 tablet 3     Omega 3-6-9 Fatty Acids (OMEGA 3-6-9 PO)        rosuvastatin (CRESTOR) 20 MG tablet Take 1 tablet (20 mg) by mouth daily. 90 tablet 3     valACYclovir (VALTREX) 1000 mg tablet Take 1 tablet (1,000 mg) by mouth daily. 30 tablet 11     No current facility-administered medications for this visit.       SOCIAL HISTORY:  I have reviewed this patient's social history and updated it with pertinent information if needed. Clare Wallace  reports that she has never smoked. She has never used smokeless tobacco. She reports that she does not drink alcohol and does not use drugs.    PHYSICAL EXAM:  Pulse:  [76] 76  BP: (133)/(78) 133/78  208 lbs 8 oz    Constitutional: alert, no distress  Respiratory: Good bilateral air entry  Cardiovascular: Regular heart sounds soft systolic murmur no rubs or gallops no edema  GI: nondistended  Neuropsychiatric: appropriate affact    ASSESSMENT: Pertinent issues addressed/ reviewed during this cardiology visit  Chest discomfort concerning for angina  Systolic murmur    RECOMMENDATIONS:  Patient has had 3 episodes of chest discomfort over the last 10 days now.  She does not smoke is not diabetic.  However she is obese and has significant hyperlipidemia with LDL levels touching 180.  Hemodynamically otherwise stable and physical exam is unrevealing.  ECG today does not show any significant changes compared to prior.  She has mild inverted T waves in V1 V2.  Otherwise no concerning resting  symptoms or active ST-T changes.  I recommend getting an echocardiogram and ischemic workup with a coronary CT angio.  Given her body habitus, this patient is a poor candidate for noninvasive testing.  Her pretest probability for coronary disease is moderate to high based on symptomatology.   If coronary CT is abnormal, I recommend invasive coronary angiogram.  In that case risk and benefits have been explained and she is willing to proceed.  In the interim I recommend aspirin statin and beta-blocker therapy.  I have prescribed her as needed nitroglycerin.  I have counseled her and her significant other, if any recurrence of symptoms or resting symptoms develop, I recommend inpatient admission to the hospital for inpatient evaluation of ischemic heart disease.  Her troponin in clinic is currently pending.  We will follow-up.  If abnormal, she will be instructed to go to the emergency department for an admission.  Otherwise we will stick to the plan as above    Recommend follow-up depending on the results of these test.    It was a pleasure seeing this patient in clinic today. Please do not hesitate to contact me with any future questions.     JOSE Dumont, [facility]  Cardiology - [facility]  March 3, 2025    Review of the result(s) of each unique test - Last CBC BMP lipids ECG. Last ECG shows sinus rhythm.     The level of medical decision making during this visit was of high complexity. Condition for which patient was treated that is considered severe and would require intensive monitoring or urgent treatment to facilitate care -evaluation for ischemic heart disease as soon as possible.    The longitudinal plan of care for the diagnosis(es)/condition(s) as documented were addressed during this visit. Due to the added complexity in care, I will continue to support Clare in the subsequent management and with ongoing continuity of care.    This note was completed in part using dictation via the Dragon voice  recognition software. Some word and grammatical errors may occur and must be interpreted in the appropriate clinical context.  If there are any questions pertaining to this issue, please contact me for further clarification.      Thank you for allowing me to participate in the care of your patient.      Sincerely,     Maryuri Amaral MD     [facility]  cc:   Francesca Dunbar MD  [address] [place] [id number]

## 2025-03-03 NOTE — PROGRESS NOTES
CARDIOLOGY CLINIC CONSULTATION    PRIMARY CARE PHYSICIAN:  Gi Chavez    HISTORY OF PRESENT ILLNESS:  This is a very pleasant 69-year-old female denies any known prior cardiovascular history.  She has a history of hypertension and takes low-dose of hydrochlorothiazide for it.  She has a history of hyperlipidemia but not on any treatment.  Her last 3 LDLs have been in the 1 70-1 80 range.  No family history of coronary disease.  No diabetes.  She is mildly obese with a BMI of 33.  No diabetes.  Creatinine potassium sodium levels are normal otherwise.    Recently she went to the urgent care because she was experiencing chest discomfort while working out on the treadmill.  On 2/25/2025, she underwent an ECG that showed normal sinus rhythm with no significant ST-T changes.    However after that she had 2 more episodes of similar exertional chest discomfort.  The patient is being seen urgently in the cardiology clinic for these ongoing symptoms.      Today the patient denies any resting symptoms.  However she is worried and feels that now she is being more attention to constant chest heaviness that has been going on since this event.  She underwent another repeat ECG today in clinic which does not show any significant changes compared to prior.  We ordered another troponin in the clinic which is negative.    No syncope presyncope bleeding problems.  Functionally very active otherwise.    PAST MEDICAL HISTORY:  Past Medical History:   Diagnosis Date    Family history of colon cancer     brother    Family history of colonic polyps     mom    Hypothyroidism     Shingles        MEDICATIONS:  Current Outpatient Medications   Medication Sig Dispense Refill    aspirin 81 MG EC tablet Take 81 mg by mouth daily.      hydroCHLOROthiazide 12.5 MG tablet TAKE 1 TABLET BY MOUTH EVERY DAY 90 tablet 2    levothyroxine (SYNTHROID/LEVOTHROID) 125 MCG tablet TAKE 1 TABLET BY MOUTH EVERY DAY 90 tablet 2    metoprolol succinate ER  (TOPROL XL) 25 MG 24 hr tablet Take 2 tablets (50 mg) by mouth daily. 30 tablet 3    nitroGLYcerin (NITROSTAT) 0.4 MG sublingual tablet For chest pain place 1 tablet under the tongue every 5 minutes for 3 doses. If symptoms persist 5 minutes after 1st dose call 911. 30 tablet 3    Omega 3-6-9 Fatty Acids (OMEGA 3-6-9 PO)       rosuvastatin (CRESTOR) 20 MG tablet Take 1 tablet (20 mg) by mouth daily. 90 tablet 3    valACYclovir (VALTREX) 1000 mg tablet Take 1 tablet (1,000 mg) by mouth daily. 30 tablet 11     No current facility-administered medications for this visit.       SOCIAL HISTORY:  I have reviewed this patient's social history and updated it with pertinent information if needed. Clare Wallace  reports that she has never smoked. She has never used smokeless tobacco. She reports that she does not drink alcohol and does not use drugs.    PHYSICAL EXAM:  Pulse:  [76] 76  BP: (133)/(78) 133/78  208 lbs 8 oz    Constitutional: alert, no distress  Respiratory: Good bilateral air entry  Cardiovascular: Regular heart sounds soft systolic murmur no rubs or gallops no edema  GI: nondistended  Neuropsychiatric: appropriate affact    ASSESSMENT: Pertinent issues addressed/ reviewed during this cardiology visit  Chest discomfort concerning for angina  Systolic murmur    RECOMMENDATIONS:  Patient has had 3 episodes of chest discomfort over the last 10 days now.  She does not smoke is not diabetic.  However she is obese and has significant hyperlipidemia with LDL levels touching 180.  Hemodynamically otherwise stable and physical exam is unrevealing.  ECG today does not show any significant changes compared to prior.  She has mild inverted T waves in V1 V2.  Otherwise no concerning resting symptoms or active ST-T changes.  Her troponin is negative.  I recommend getting an echocardiogram and ischemic workup with a coronary CT angio.  Given her body habitus, this patient is a poor candidate for noninvasive testing.  Her  pretest probability for coronary disease is moderate to high based on symptomatology.   If coronary CT is abnormal, I recommend invasive coronary angiogram.  In that case risk and benefits have been explained and she is willing to proceed.  In the interim I recommend aspirin statin and beta-blocker therapy.  I have prescribed her as needed nitroglycerin.  I have counseled her and her significant other, if any recurrence of symptoms or resting symptoms develop, I recommend inpatient admission to the hospital for inpatient evaluation of ischemic heart disease.    Recommend follow-up depending on the results of these test.    It was a pleasure seeing this patient in clinic today. Please do not hesitate to contact me with any future questions.     JOSE Dumont, [facility]  Cardiology - [facility]  March 3, 2025    Review of the result(s) of each unique test - Last CBC BMP lipids ECG. Last ECG shows sinus rhythm.     The level of medical decision making during this visit was of high complexity. Condition for which patient was treated that is considered severe and would require intensive monitoring or urgent treatment to facilitate care -evaluation for ischemic heart disease as soon as possible.    The longitudinal plan of care for the diagnosis(es)/condition(s) as documented were addressed during this visit. Due to the added complexity in care, I will continue to support Clare in the subsequent management and with ongoing continuity of care.    This note was completed in part using dictation via the Dragon voice recognition software. Some word and grammatical errors may occur and must be interpreted in the appropriate clinical context.  If there are any questions pertaining to this issue, please contact me for further clarification.

## 2025-03-07 ENCOUNTER — HOSPITAL ENCOUNTER (EMERGENCY)
Facility: CLINIC | Age: 70
Discharge: HOME | End: 2025-03-07
Attending: EMERGENCY MEDICINE | Admitting: EMERGENCY MEDICINE
Payer: COMMERCIAL

## 2025-03-07 VITALS
TEMPERATURE: 97.2 F | SYSTOLIC BLOOD PRESSURE: 132 MMHG | HEART RATE: 62 BPM | DIASTOLIC BLOOD PRESSURE: 79 MMHG | RESPIRATION RATE: 18 BRPM | OXYGEN SATURATION: 95 %

## 2025-03-07 DIAGNOSIS — R07.9 CHEST PAIN, UNSPECIFIED TYPE: ICD-10-CM

## 2025-03-07 LAB
ANION GAP SERPL CALCULATED.3IONS-SCNC: 8 MMOL/L (ref 7–15)
ATRIAL RATE - MUSE: 61 BPM
BASOPHILS # BLD AUTO: 0.1 10E3/UL (ref 0–0.2)
BASOPHILS NFR BLD AUTO: 1 %
BUN SERPL-MCNC: 12.3 MG/DL (ref 8–23)
CALCIUM SERPL-MCNC: 10.8 MG/DL (ref 8.8–10.4)
CHLORIDE SERPL-SCNC: 99 MMOL/L (ref 98–107)
CREAT SERPL-MCNC: 0.71 MG/DL (ref 0.51–0.95)
DIASTOLIC BLOOD PRESSURE - MUSE: NORMAL MMHG
EGFRCR SERPLBLD CKD-EPI 2021: >90 ML/MIN/1.73M2
EOSINOPHIL # BLD AUTO: 0.1 10E3/UL (ref 0–0.7)
EOSINOPHIL NFR BLD AUTO: 1 %
ERYTHROCYTE [DISTWIDTH] IN BLOOD BY AUTOMATED COUNT: 11.7 % (ref 10–15)
GLUCOSE SERPL-MCNC: 94 MG/DL (ref 70–99)
HCO3 SERPL-SCNC: 26 MMOL/L (ref 22–29)
HCT VFR BLD AUTO: 42.1 % (ref 35–47)
HGB BLD-MCNC: 14.6 G/DL (ref 11.7–15.7)
HOLD SPECIMEN: NORMAL
IMM GRANULOCYTES # BLD: 0 10E3/UL
IMM GRANULOCYTES NFR BLD: 0 %
INTERPRETATION ECG - MUSE: NORMAL
LYMPHOCYTES # BLD AUTO: 2.3 10E3/UL (ref 0.8–5.3)
LYMPHOCYTES NFR BLD AUTO: 35 %
MCH RBC QN AUTO: 31.1 PG (ref 26.5–33)
MCHC RBC AUTO-ENTMCNC: 34.7 G/DL (ref 31.5–36.5)
MCV RBC AUTO: 90 FL (ref 78–100)
MONOCYTES # BLD AUTO: 0.6 10E3/UL (ref 0–1.3)
MONOCYTES NFR BLD AUTO: 9 %
NEUTROPHILS # BLD AUTO: 3.5 10E3/UL (ref 1.6–8.3)
NEUTROPHILS NFR BLD AUTO: 54 %
NRBC # BLD AUTO: 0 10E3/UL
NRBC BLD AUTO-RTO: 0 /100
P AXIS - MUSE: 29 DEGREES
PLATELET # BLD AUTO: 265 10E3/UL (ref 150–450)
POTASSIUM SERPL-SCNC: 3.8 MMOL/L (ref 3.4–5.3)
PR INTERVAL - MUSE: 194 MS
QRS DURATION - MUSE: 88 MS
QT - MUSE: 374 MS
QTC - MUSE: 376 MS
R AXIS - MUSE: 49 DEGREES
RBC # BLD AUTO: 4.69 10E6/UL (ref 3.8–5.2)
SODIUM SERPL-SCNC: 133 MMOL/L (ref 135–145)
SYSTOLIC BLOOD PRESSURE - MUSE: NORMAL MMHG
T AXIS - MUSE: 50 DEGREES
TROPONIN T SERPL HS-MCNC: <6 NG/L
VENTRICULAR RATE- MUSE: 61 BPM
WBC # BLD AUTO: 6.4 10E3/UL (ref 4–11)

## 2025-03-07 PROCEDURE — 250N000013 HC RX MED GY IP 250 OP 250 PS 637: Performed by: EMERGENCY MEDICINE

## 2025-03-07 PROCEDURE — 84484 ASSAY OF TROPONIN QUANT: CPT | Performed by: EMERGENCY MEDICINE

## 2025-03-07 PROCEDURE — 85004 AUTOMATED DIFF WBC COUNT: CPT | Performed by: EMERGENCY MEDICINE

## 2025-03-07 PROCEDURE — 80048 BASIC METABOLIC PNL TOTAL CA: CPT | Performed by: EMERGENCY MEDICINE

## 2025-03-07 PROCEDURE — 36415 COLL VENOUS BLD VENIPUNCTURE: CPT | Performed by: EMERGENCY MEDICINE

## 2025-03-07 PROCEDURE — 99284 EMERGENCY DEPT VISIT MOD MDM: CPT

## 2025-03-07 PROCEDURE — 85025 COMPLETE CBC W/AUTO DIFF WBC: CPT | Performed by: EMERGENCY MEDICINE

## 2025-03-07 PROCEDURE — 82435 ASSAY OF BLOOD CHLORIDE: CPT | Performed by: EMERGENCY MEDICINE

## 2025-03-07 PROCEDURE — 85018 HEMOGLOBIN: CPT | Performed by: EMERGENCY MEDICINE

## 2025-03-07 PROCEDURE — 250N000009 HC RX 250: Performed by: EMERGENCY MEDICINE

## 2025-03-07 PROCEDURE — 93005 ELECTROCARDIOGRAM TRACING: CPT

## 2025-03-07 RX ORDER — LIDOCAINE HYDROCHLORIDE 20 MG/ML
15 SOLUTION OROPHARYNGEAL ONCE
Status: COMPLETED | OUTPATIENT
Start: 2025-03-07 | End: 2025-03-07

## 2025-03-07 RX ORDER — ISOSORBIDE MONONITRATE 30 MG/1
30 TABLET, EXTENDED RELEASE ORAL DAILY
Qty: 14 TABLET | Refills: 0 | Status: SHIPPED | OUTPATIENT
Start: 2025-03-07

## 2025-03-07 RX ORDER — MAGNESIUM HYDROXIDE/ALUMINUM HYDROXICE/SIMETHICONE 120; 1200; 1200 MG/30ML; MG/30ML; MG/30ML
30 SUSPENSION ORAL ONCE
Status: COMPLETED | OUTPATIENT
Start: 2025-03-07 | End: 2025-03-07

## 2025-03-07 RX ADMIN — LIDOCAINE HYDROCHLORIDE 15 ML: 20 SOLUTION ORAL at 16:05

## 2025-03-07 RX ADMIN — ALUMINUM HYDROXIDE, MAGNESIUM HYDROXIDE, AND SIMETHICONE 30 ML: 200; 200; 20 SUSPENSION ORAL at 16:05

## 2025-03-07 ASSESSMENT — ACTIVITIES OF DAILY LIVING (ADL)
ADLS_ACUITY_SCORE: 41

## 2025-03-07 ASSESSMENT — COLUMBIA-SUICIDE SEVERITY RATING SCALE - C-SSRS
2. HAVE YOU ACTUALLY HAD ANY THOUGHTS OF KILLING YOURSELF IN THE PAST MONTH?: NO
6. HAVE YOU EVER DONE ANYTHING, STARTED TO DO ANYTHING, OR PREPARED TO DO ANYTHING TO END YOUR LIFE?: NO
1. IN THE PAST MONTH, HAVE YOU WISHED YOU WERE DEAD OR WISHED YOU COULD GO TO SLEEP AND NOT WAKE UP?: NO
6. HAVE YOU EVER DONE ANYTHING, STARTED TO DO ANYTHING, OR PREPARED TO DO ANYTHING TO END YOUR LIFE?: NO
1. IN THE PAST MONTH, HAVE YOU WISHED YOU WERE DEAD OR WISHED YOU COULD GO TO SLEEP AND NOT WAKE UP?: NO
2. HAVE YOU ACTUALLY HAD ANY THOUGHTS OF KILLING YOURSELF IN THE PAST MONTH?: NO

## 2025-03-07 ASSESSMENT — HEART SCORE
TROPONIN: LESS THAN OR EQUAL TO NORMAL LIMIT
HISTORY: SLIGHTLY SUSPICIOUS
RISK FACTORS: 1-2 RISK FACTORS
AGE: 65+
HEART SCORE: 3
ECG: NORMAL

## 2025-03-07 NOTE — ED PROVIDER NOTES
Emergency Department Note      History of Present Illness     Chief Complaint   Chest Pain    HPI   Clare Wallace is a 69 year old female with who presents to the ED with her  for evaluation of chest pain. Clare reports having central sternal chest pain for the last few weeks, she notes that it had only been while excising. Yesterday, she began to have mild central chest pain while at rest, this pain continued into today. She denies nausea, shortness of breath, jaw pain, or arm pain.     Independent Historian   None    Review of External Notes   I reviewed the cardiology note from 03/03/2025, when she was seen for chest pain.  Dr Amaral:    RECOMMENDATIONS:  Patient has had 3 episodes of chest discomfort over the last 10 days now.  She does not smoke is not diabetic.  However she is obese and has significant hyperlipidemia with LDL levels touching 180.  Hemodynamically otherwise stable and physical exam is unrevealing.  ECG today does not show any significant changes compared to prior.  She has mild inverted T waves in V1 V2.  Otherwise no concerning resting symptoms or active ST-T changes.  Her troponin is negative.  I recommend getting an echocardiogram and ischemic workup with a coronary CT angio.  Given her body habitus, this patient is a poor candidate for noninvasive testing.  Her pretest probability for coronary disease is moderate to high based on symptomatology.   If coronary CT is abnormal, I recommend invasive coronary angiogram.  In that case risk and benefits have been explained and she is willing to proceed.  In the interim I recommend aspirin statin and beta-blocker therapy.  I have prescribed her as needed nitroglycerin.  I have counseled her and her significant other, if any recurrence of symptoms or resting symptoms develop, I recommend inpatient admission to the hospital for inpatient evaluation of ischemic heart disease.    Past Medical History     Medical History and Problem List    Choroidal nevus of right eye   Carpal tunnel syndrome   Genital herpes   Hypothyroidism   Heart murmur   Myopia of right eye   Migraine   Menopausal state   Presbyopia   Shingles   Tinnitus     Medications   Aspirin 81 mg   Crestor   Hydrochlorothiazide   Synthroid   Nitrostat   Toprol   Valtrex     Surgical History   Past Surgical History:   Procedure Laterality Date    CHOLECYSTECTOMY      lap    COLONOSCOPY Left 6/21/2021    Procedure: COLONOSCOPY, WITH POLYPECTOMY AND BIOPSY; polypectomies using cold bx forcep and hot snare;  Surgeon: Germán Garcia MD;  Location:  GI     Physical Exam     Patient Vitals for the past 24 hrs:   BP Temp Temp src Pulse Resp SpO2   03/07/25 1324 132/79 97.2  F (36.2  C) Temporal 62 18 95 %     Physical Exam  Nursing note and vitals reviewed.    Constitutional:  Appears comfortable.    HENT:                Nose normal.  No discharge.      Oral mucosa is moist.  Eyes:    Conjunctivae are normal without injection.  Pupils are equal.  Cardiovascular:  Normal rate, regular rhythm with normal S1 and S2.      Normal heart sounds and peripheral pulses 2+ and equal.       No murmur or denis.  Pulmonary:  Effort normal and breath sounds clear to auscultation bilaterally.    No respiratory distress.  No stridor.     No wheezes. No rales.     GI:    Soft. No distension and no mass. No tenderness. No epigastric tenderness. Some peristernal pain when pressure is applied, not reproducible of described pain.    Musculoskeletal:  Normal range of motion. No extremity deformity.     No edema and no tenderness.    Neurological:   Alert and oriented. No focal weakness.  Skin:    Skin is warm and dry. No rash noted.   Psychiatric:   Behavior is normal. Appropriate mood and affect.     Judgment and thought content normal.     Diagnostics     Lab Results   Labs Ordered and Resulted from Time of ED Arrival to Time of ED Departure   BASIC METABOLIC PANEL - Abnormal       Result Value    Sodium 133 (*)      Potassium 3.8      Chloride 99      Carbon Dioxide (CO2) 26      Anion Gap 8      Urea Nitrogen 12.3      Creatinine 0.71      GFR Estimate >90      Calcium 10.8 (*)     Glucose 94     TROPONIN T, HIGH SENSITIVITY - Normal    Troponin T, High Sensitivity <6     CBC WITH PLATELETS AND DIFFERENTIAL    WBC Count 6.4      RBC Count 4.69      Hemoglobin 14.6      Hematocrit 42.1      MCV 90      MCH 31.1      MCHC 34.7      RDW 11.7      Platelet Count 265      % Neutrophils 54      % Lymphocytes 35      % Monocytes 9      % Eosinophils 1      % Basophils 1      % Immature Granulocytes 0      NRBCs per 100 WBC 0      Absolute Neutrophils 3.5      Absolute Lymphocytes 2.3      Absolute Monocytes 0.6      Absolute Eosinophils 0.1      Absolute Basophils 0.1      Absolute Immature Granulocytes 0.0      Absolute NRBCs 0.0       Imaging   No orders to display     EKG   ECG taken at 1314, ECG read at 1552  Normal sinus rhythm    No significant changes as compared to prior, dated 03/03/2025.  Rate 61 bpm. WA interval 194 ms. QRS duration 88 ms. QT/QTc 374/376 ms. P-R-T axes 29 49 50.    Independent Interpretation   None    ED Course      Medications Administered   Medications   alum & mag hydroxide-simethicone (MAALOX) suspension 30 mL (30 mLs Oral $Given 3/7/25 1605)   lidocaine (viscous) (XYLOCAINE) 2 % solution 15 mL (15 mLs Mouth/Throat $Given 3/7/25 1605)     Procedures   Procedures     Discussion of Management   Cardiology, Dr. Valdivia.    ED Course   ED Course as of 03/07/25 2115   Fri Mar 07, 2025   1552 I obtained history and examined the patient as noted above.    1644 I updated the patient, I will be discussing the patient with a cardiologist.    1735 I spoke to cardiologist Dr. Valdivia regarding the patient's labs, presentation to the ED, and recent visit with the established cardiologist.     1738 I updated the patient and her  with my plan for the patient.      Additional Documentation  None    Medical  Decision Making / Diagnosis     CMS Diagnoses: None    MIPS       None    [facility]   Clare Wallace is a 69 year old female who comes in with 2 weeks of chest pain for 2 weeks and was better but still has a small amount of substernal chest tightness.  It is a 1 out of 10.  Her EKG is normal.   She had seen Dr. Amaral on the third who has scheduled her for a CT coronary study next Friday.  Her labs here came back normal with a troponin that was less than 6, sodium was slightly low at 133 and calcium increased at 10.8 and normal CBC.  I did give her a GI cocktail and she did not really get any relief from that.  Is still possible this could be esophageal.  Not worse with breathing, does not get worse with exertion she is not short of breath.  She has a heart score of 3.  I talked with the cardiologist on-call and he felt it was reasonable to have the patient go home but started her on a small dose of Imdur in the morning until she can get further worked up as an outpatient.  We do not have cardiac CT on the weekend or stress testing and they would not do an angiogram until Monday.  The patient's pain is very minimal at this time and she is comfortable going home and instructed to return if her symptoms worsen especially if short of breath.  Cardiologist was in support of this plan as well.    Continue your current medications, take the Imdur starting tomorrow morning.  Contact your cardiologist Monday to see if they want to move your CT up otherwise keep it for Friday as long as you are doing well with minimal symptoms.  If you get worse over the weekend with increasing chest pain and shortness of breath, return to the ER.    Disposition   The patient was discharged home.     Diagnosis     ICD-10-CM    1. Chest pain, unspecified type  R07.9          Discharge Medications   Discharge Medication List as of 3/7/2025  5:52 PM        START taking these medications    Details   isosorbide mononitrate (IMDUR) 30 MG 24 hr tablet  Take 1 tablet (30 mg) by mouth daily., Disp-14 tablet, R-0, E-Prescribe           I, Babita Persaud, am serving as a scribe at 3:45 PM on 3/7/2025 to document services personally performed by Shaniqua Ayers MD based on my observations and the provider's statements to me.        Shaniqua Ayers MD  03/07/25 [id number]

## 2025-03-07 NOTE — ED TRIAGE NOTES
Pt reports having chest pain with activity 2 weeks ago, pt saw a cardiologist for this yesterday.  Pt presents today with mils central chest pain when sitting that resolves when she walks.  Pt denies dizziness or SOB.     Triage Assessment (Adult)       Row Name 03/07/25 1322          Triage Assessment    Airway WDL WDL        Respiratory WDL    Respiratory WDL WDL        Skin Circulation/Temperature WDL    Skin Circulation/Temperature WDL WDL        Cardiac WDL    Cardiac WDL X  see note        Peripheral/Neurovascular WDL    Peripheral Neurovascular WDL WDL        Cognitive/Neuro/Behavioral WDL    Cognitive/Neuro/Behavioral WDL WDL

## 2025-03-07 NOTE — DISCHARGE INSTRUCTIONS
Continue your current medications, take the Imdur starting tomorrow morning.  Contact your cardiologist Monday to see if they want to move your CT up otherwise keep it for Friday as long as you are doing well with minimal symptoms.  If you get worse over the weekend with increasing chest pain and shortness of breath, return to the ER.

## 2025-03-10 DIAGNOSIS — R07.9 CHEST PAIN, UNSPECIFIED TYPE: Primary | ICD-10-CM

## 2025-03-10 DIAGNOSIS — R01.1 HEART MURMUR: ICD-10-CM

## 2025-03-11 ENCOUNTER — HOSPITAL ENCOUNTER (OUTPATIENT)
Dept: CARDIOLOGY | Facility: CLINIC | Age: 70
Discharge: HOME | End: 2025-03-11
Attending: INTERNAL MEDICINE
Payer: COMMERCIAL

## 2025-03-11 ENCOUNTER — CARE COORDINATION (OUTPATIENT)
Dept: CARDIOLOGY | Facility: CLINIC | Age: 70
End: 2025-03-11

## 2025-03-11 DIAGNOSIS — E78.5 HYPERLIPIDEMIA LDL GOAL <70: ICD-10-CM

## 2025-03-11 DIAGNOSIS — R01.1 HEART MURMUR: ICD-10-CM

## 2025-03-11 DIAGNOSIS — I25.10 CORONARY ARTERY DISEASE: Primary | ICD-10-CM

## 2025-03-11 DIAGNOSIS — R07.9 CHEST PAIN, UNSPECIFIED TYPE: ICD-10-CM

## 2025-03-11 LAB — LVEF ECHO: NORMAL

## 2025-03-11 PROCEDURE — 93306 TTE W/DOPPLER COMPLETE: CPT

## 2025-03-11 PROCEDURE — 93306 TTE W/DOPPLER COMPLETE: CPT | Mod: 26 | Performed by: INTERNAL MEDICINE

## 2025-03-14 ENCOUNTER — HOSPITAL ENCOUNTER (OUTPATIENT)
Dept: CARDIOLOGY | Facility: CLINIC | Age: 70
Discharge: HOME | End: 2025-03-14
Attending: INTERNAL MEDICINE | Admitting: INTERNAL MEDICINE
Payer: COMMERCIAL

## 2025-03-14 VITALS — SYSTOLIC BLOOD PRESSURE: 133 MMHG | DIASTOLIC BLOOD PRESSURE: 76 MMHG | HEART RATE: 57 BPM

## 2025-03-14 DIAGNOSIS — R07.9 CHEST PAIN, UNSPECIFIED TYPE: ICD-10-CM

## 2025-03-14 PROCEDURE — 250N000013 HC RX MED GY IP 250 OP 250 PS 637: Performed by: INTERNAL MEDICINE

## 2025-03-14 PROCEDURE — 75574 CT ANGIO HRT W/3D IMAGE: CPT | Mod: 26 | Performed by: INTERNAL MEDICINE

## 2025-03-14 PROCEDURE — 75574 CT ANGIO HRT W/3D IMAGE: CPT

## 2025-03-14 PROCEDURE — 250N000011 HC RX IP 250 OP 636: Performed by: INTERNAL MEDICINE

## 2025-03-14 RX ORDER — METOPROLOL TARTRATE 25 MG/1
25-100 TABLET, FILM COATED ORAL
Status: DISCONTINUED | OUTPATIENT
Start: 2025-03-14 | End: 2025-03-15 | Stop reason: HOSPADM

## 2025-03-14 RX ORDER — METOPROLOL TARTRATE 1 MG/ML
5-20 INJECTION, SOLUTION INTRAVENOUS
Status: DISCONTINUED | OUTPATIENT
Start: 2025-03-14 | End: 2025-03-15 | Stop reason: HOSPADM

## 2025-03-14 RX ORDER — DILTIAZEM HYDROCHLORIDE 5 MG/ML
10-15 INJECTION INTRAVENOUS
Status: DISCONTINUED | OUTPATIENT
Start: 2025-03-14 | End: 2025-03-15 | Stop reason: HOSPADM

## 2025-03-14 RX ORDER — IVABRADINE 5 MG/1
5-15 TABLET, FILM COATED ORAL
Status: DISCONTINUED | OUTPATIENT
Start: 2025-03-14 | End: 2025-03-15 | Stop reason: HOSPADM

## 2025-03-14 RX ORDER — NITROGLYCERIN 0.4 MG/1
0.4 TABLET SUBLINGUAL
Status: DISCONTINUED | OUTPATIENT
Start: 2025-03-14 | End: 2025-03-15 | Stop reason: HOSPADM

## 2025-03-14 RX ORDER — IOPAMIDOL 755 MG/ML
500 INJECTION, SOLUTION INTRAVASCULAR ONCE
Status: COMPLETED | OUTPATIENT
Start: 2025-03-14 | End: 2025-03-14

## 2025-03-14 RX ORDER — ONDANSETRON 2 MG/ML
4 INJECTION INTRAMUSCULAR; INTRAVENOUS
Status: DISCONTINUED | OUTPATIENT
Start: 2025-03-14 | End: 2025-03-15 | Stop reason: HOSPADM

## 2025-03-14 RX ORDER — LIDOCAINE 40 MG/G
CREAM TOPICAL
Status: DISCONTINUED | OUTPATIENT
Start: 2025-03-14 | End: 2025-03-15 | Stop reason: HOSPADM

## 2025-03-14 RX ORDER — DILTIAZEM HCL 60 MG
120 TABLET ORAL
Status: DISCONTINUED | OUTPATIENT
Start: 2025-03-14 | End: 2025-03-15 | Stop reason: HOSPADM

## 2025-03-14 RX ADMIN — IOPAMIDOL 110 ML: 755 INJECTION, SOLUTION INTRAVENOUS at 13:16

## 2025-03-14 RX ADMIN — NITROGLYCERIN 0.4 MG: 0.4 TABLET SUBLINGUAL at 13:05

## 2025-04-21 ENCOUNTER — LAB (OUTPATIENT)
Dept: LAB | Facility: CLINIC | Age: 70
End: 2025-04-21
Payer: COMMERCIAL

## 2025-04-21 DIAGNOSIS — I25.10 CORONARY ARTERY DISEASE: ICD-10-CM

## 2025-04-21 DIAGNOSIS — E78.5 HYPERLIPIDEMIA LDL GOAL <70: ICD-10-CM

## 2025-04-21 LAB
ALT SERPL W P-5'-P-CCNC: 24 U/L (ref 0–50)
CHOLEST SERPL-MCNC: 144 MG/DL
FASTING STATUS PATIENT QL REPORTED: YES
HDLC SERPL-MCNC: 64 MG/DL
LDLC SERPL CALC-MCNC: 73 MG/DL
NONHDLC SERPL-MCNC: 80 MG/DL
TRIGL SERPL-MCNC: 34 MG/DL

## 2025-04-21 PROCEDURE — 80061 LIPID PANEL: CPT

## 2025-04-21 PROCEDURE — 84460 ALANINE AMINO (ALT) (SGPT): CPT

## 2025-04-21 PROCEDURE — 36415 COLL VENOUS BLD VENIPUNCTURE: CPT

## 2025-04-22 ENCOUNTER — TELEPHONE (OUTPATIENT)
Dept: CARDIOLOGY | Facility: CLINIC | Age: 70
End: 2025-04-22

## 2025-04-22 NOTE — TELEPHONE ENCOUNTER
1st attempt- Left voicemail for the patient to call back and schedule the following:    Appointment type:  Return Cardiology  Provider:  Cookie Casey  Return date:  next available  Additional appointment(s) needed:  -  Additional Notes:  -  Specialty phone number: [phone]

## 2025-04-23 ENCOUNTER — OFFICE VISIT (OUTPATIENT)
Dept: CARDIOLOGY | Facility: CLINIC | Age: 70
End: 2025-04-23
Payer: COMMERCIAL

## 2025-04-23 ENCOUNTER — ORDERS ONLY (AUTO-RELEASED) (OUTPATIENT)
Dept: CARDIOLOGY | Facility: CLINIC | Age: 70
End: 2025-04-23

## 2025-04-23 VITALS
HEIGHT: 67 IN | WEIGHT: 209.9 LBS | DIASTOLIC BLOOD PRESSURE: 64 MMHG | HEART RATE: 69 BPM | BODY MASS INDEX: 32.94 KG/M2 | SYSTOLIC BLOOD PRESSURE: 106 MMHG | OXYGEN SATURATION: 96 %

## 2025-04-23 DIAGNOSIS — R07.9 EXERTIONAL CHEST PAIN: Primary | ICD-10-CM

## 2025-04-23 DIAGNOSIS — R00.2 PALPITATIONS: ICD-10-CM

## 2025-04-23 DIAGNOSIS — R93.1 ELEVATED CORONARY ARTERY CALCIUM SCORE: ICD-10-CM

## 2025-04-23 NOTE — LETTER
4/23/2025    Gi Chavez MD  [address] [facility] [address]    RE: Clare Wallace       Dear Colleague,     I had the pleasure of seeing Clare Wallace in the [facility].  Cardiology Clinic Progress Note  Clare Wallace MRN# [id number]   YOB: 1955 Age: 69 year old         Primary Cardiologist: Dr. Amaral       Assessment:     1.  Exertional chest pain / elevated CAC score  -Echocardiogram with LVEF 60 to 65%, no RWMA, trace valvular disease, no pericardial effusion  - Cardiac CTA with total Agaston score of 52.7, 70th percentile, mild to moderate mid LAD stenosis  - she continues to have exertional chest pain, have ordered stress echocardiogram to evaluate for LAD ischemia. If abnormal, will need invasive angiogram.   - continue ASA 81 mg daily, rosuvastatin 20 mg daily  - she developed headache with Imdur and no longer taking it     2. HTN- well controlled on HCTZ 12.5 mg daily, Toprol 50 mg daily     3. HLD- elevated Lipoprotein A 170. LDL goal 70, on Crestor 20 mg daily. LDL now 73.     4. Obesity     5. Heart palpitations - sounds like PACs/ PVCs, 2 week Zio monitor ordered        Plan:   - schedule stress echocardiogram  - wear heart monitor for 2 weeks    - resume taking Aspirin   - continue other cardiac medications the same   - follow up in 6 weeks after stress test and Zio resulted      LUANN [facility]        HPI:   Clare Wallace is a very pleasant 69 year old female with PMH significant for HTN, HLD with elevated lipoprotein A, BMI 33 recently evaluated in cardiology clinic by Dr. Amaral 3/2025 for chest pain. EKG with NSR and no significant ST-T changes. Echocardiogram 3/2025 demonstrated LVEF 60-65% with no RWMA, normal RV, trace valvular disease and no pericardial effusion. Cardiac CT demonstrated total agatston score 52.7, placing the patient in the 70th percentile, Mild to moderate mid left anterior  "descending artery stenosis, no significant disease noted in the remainder of the coronary vasculature.     She was in the ER 3/7/35 with recurrent exertional chest pain and ruled out for MI. She was started on Imdur 30 mg daily.     Today, patient reports she has ongoing exertional chest pain with walkign briskly or walking at an incline. She noted symptoms while walking in from the parking lot today. She does not get chest pain at rest. No DAVID or SOB. She has not been taking the Imdur (it gave her headaches). She also reports some brief, occasional  fluttering in her chest that is more frequent lately.         ROS:   Please see pertinent positives in HPI      Medications:   Current Outpatient Medications   Medication Sig Dispense Refill     aspirin 81 MG EC tablet Take 81 mg by mouth daily.       hydroCHLOROthiazide 12.5 MG tablet TAKE 1 TABLET BY MOUTH EVERY DAY 90 tablet 2     isosorbide mononitrate (IMDUR) 30 MG 24 hr tablet Take 1 tablet (30 mg) by mouth daily. 14 tablet 0     levothyroxine (SYNTHROID/LEVOTHROID) 125 MCG tablet TAKE 1 TABLET BY MOUTH EVERY DAY 90 tablet 2     metoprolol succinate ER (TOPROL XL) 25 MG 24 hr tablet Take 2 tablets (50 mg) by mouth daily. 30 tablet 3     nitroGLYcerin (NITROSTAT) 0.4 MG sublingual tablet For chest pain place 1 tablet under the tongue every 5 minutes for 3 doses. If symptoms persist 5 minutes after 1st dose call 911. 30 tablet 3     Omega 3-6-9 Fatty Acids (OMEGA 3-6-9 PO)        rosuvastatin (CRESTOR) 20 MG tablet Take 1 tablet (20 mg) by mouth daily. 90 tablet 3     valACYclovir (VALTREX) 1000 mg tablet Take 1 tablet (1,000 mg) by mouth daily. 30 tablet 11         Physical Exam:     Vitals: /64 (BP Location: Right arm, Patient Position: Sitting, Cuff Size: Adult Large)   Pulse 69   Ht 1.702 m (5' 7\")   Wt 95.2 kg (209 lb 14.4 oz)   SpO2 96%   BMI 32.87 kg/m        Wt Readings from Last 4 Encounters:   03/03/25 94.6 kg (208 lb 8 oz)   11/08/24 92.2 kg " (203 lb 3.2 oz)   11/03/23 100.1 kg (220 lb 9.6 oz)   10/13/22 102 kg (224 lb 12.8 oz)       GEN: NAD  C/V:  Regular rate and rhythm, no murmur.    RESP: Respirations are unlabored. Clear to auscultation bilaterally without wheezing, rales, or rhonchi.  EXT: No LE edema.      Data:     Last lab work personally reviewed     Echo: 3/2025  1. The left ventricle is normal in size. There is normal left ventricular wall  thickness. Left ventricular systolic function is normal. The visual ejection  fraction is 60-65%. Diastolic Doppler findings (E/E' ratio and/or other  parameters) suggest left ventricular filling pressures are normal. No regional  wall motion abnormalities noted.  2. The right ventricle is normal size. The right ventricular systolic function  is normal.  3. Trace mitral and tricuspid regurgitation.  4. No pericardial effusion.  5. No previous study for comparison.    Cardiac CT 3/2025  1. Total Agatston score 52.7, placing the patient in the 70th  percentile when compared to age and gender matched control group.  2. Mild to moderate mid left anterior descending artery stenosis, no  significant disease noted in the remainder of the coronary  vasculature.           Thank you for allowing me to participate in the care of your patient.      Sincerely,     LUANN [facility]  cc:   Maryuri Amaral MD  [address]  [place] [address]

## 2025-04-23 NOTE — PROGRESS NOTES
Cardiology Clinic Progress Note  Clare Wallace MRN# [id number]   YOB: 1955 Age: 69 year old         Primary Cardiologist: Dr. Amaral       Assessment:     1.  Exertional chest pain / elevated CAC score  -Echocardiogram with LVEF 60 to 65%, no RWMA, trace valvular disease, no pericardial effusion  - Cardiac CTA with total Agaston score of 52.7, 70th percentile, mild to moderate mid LAD stenosis  - she continues to have exertional chest pain, have ordered stress echocardiogram to evaluate for LAD ischemia. If abnormal, will need invasive angiogram.   - continue ASA 81 mg daily, rosuvastatin 20 mg daily  - she developed headache with Imdur and no longer taking it     2. HTN- well controlled on HCTZ 12.5 mg daily, Toprol 50 mg daily     3. HLD- elevated Lipoprotein A 170. LDL goal 70, on Crestor 20 mg daily. LDL now 73.     4. Obesity     5. Heart palpitations - sounds like PACs/ PVCs, 2 week Zio monitor ordered        Plan:   - schedule stress echocardiogram  - wear heart monitor for 2 weeks    - resume taking Aspirin   - continue other cardiac medications the same   - follow up in 6 weeks after stress test and Zio resulted      Cookie Casey PA-C  [facility]        HPI:   Clare Wallace is a very pleasant 69 year old female with PMH significant for HTN, HLD with elevated lipoprotein A, BMI 33 recently evaluated in cardiology clinic by Dr. Amaral 3/2025 for chest pain. EKG with NSR and no significant ST-T changes. Echocardiogram 3/2025 demonstrated LVEF 60-65% with no RWMA, normal RV, trace valvular disease and no pericardial effusion. Cardiac CT demonstrated total agatston score 52.7, placing the patient in the 70th percentile, Mild to moderate mid left anterior descending artery stenosis, no significant disease noted in the remainder of the coronary vasculature.     She was in the ER 3/7/35 with recurrent exertional chest pain and ruled out for MI. She was started on Imdur 30 mg  "daily.     Today, patient reports she has ongoing exertional chest pain with walkign briskly or walking at an incline. She noted symptoms while walking in from the parking lot today. She does not get chest pain at rest. No DAVID or SOB. She has not been taking the Imdur (it gave her headaches). She also reports some brief, occasional  fluttering in her chest that is more frequent lately.         ROS:   Please see pertinent positives in HPI      Medications:   Current Outpatient Medications   Medication Sig Dispense Refill    aspirin 81 MG EC tablet Take 81 mg by mouth daily.      hydroCHLOROthiazide 12.5 MG tablet TAKE 1 TABLET BY MOUTH EVERY DAY 90 tablet 2    isosorbide mononitrate (IMDUR) 30 MG 24 hr tablet Take 1 tablet (30 mg) by mouth daily. 14 tablet 0    levothyroxine (SYNTHROID/LEVOTHROID) 125 MCG tablet TAKE 1 TABLET BY MOUTH EVERY DAY 90 tablet 2    metoprolol succinate ER (TOPROL XL) 25 MG 24 hr tablet Take 2 tablets (50 mg) by mouth daily. 30 tablet 3    nitroGLYcerin (NITROSTAT) 0.4 MG sublingual tablet For chest pain place 1 tablet under the tongue every 5 minutes for 3 doses. If symptoms persist 5 minutes after 1st dose call 911. 30 tablet 3    Omega 3-6-9 Fatty Acids (OMEGA 3-6-9 PO)       rosuvastatin (CRESTOR) 20 MG tablet Take 1 tablet (20 mg) by mouth daily. 90 tablet 3    valACYclovir (VALTREX) 1000 mg tablet Take 1 tablet (1,000 mg) by mouth daily. 30 tablet 11         Physical Exam:     Vitals: /64 (BP Location: Right arm, Patient Position: Sitting, Cuff Size: Adult Large)   Pulse 69   Ht 1.702 m (5' 7\")   Wt 95.2 kg (209 lb 14.4 oz)   SpO2 96%   BMI 32.87 kg/m        Wt Readings from Last 4 Encounters:   03/03/25 94.6 kg (208 lb 8 oz)   11/08/24 92.2 kg (203 lb 3.2 oz)   11/03/23 100.1 kg (220 lb 9.6 oz)   10/13/22 102 kg (224 lb 12.8 oz)       GEN: NAD  C/V:  Regular rate and rhythm, no murmur.    RESP: Respirations are unlabored. Clear to auscultation bilaterally without wheezing, " rales, or rhonchi.  EXT: No LE edema.      Data:     Last lab work personally reviewed     Echo: 3/2025  1. The left ventricle is normal in size. There is normal left ventricular wall  thickness. Left ventricular systolic function is normal. The visual ejection  fraction is 60-65%. Diastolic Doppler findings (E/E' ratio and/or other  parameters) suggest left ventricular filling pressures are normal. No regional  wall motion abnormalities noted.  2. The right ventricle is normal size. The right ventricular systolic function  is normal.  3. Trace mitral and tricuspid regurgitation.  4. No pericardial effusion.  5. No previous study for comparison.    Cardiac CT 3/2025  1. Total Agatston score 52.7, placing the patient in the 70th  percentile when compared to age and gender matched control group.  2. Mild to moderate mid left anterior descending artery stenosis, no  significant disease noted in the remainder of the coronary  vasculature.

## 2025-04-23 NOTE — PATIENT INSTRUCTIONS
Thank you for your visit with the [facility] today.    Today's plan:   - schedule stress echocardiogram  - wear heart monitor for 2 weeks    - resume taking Aspirin   - continue other cardiac medications the same   - follow up in 6 weeks     If you have questions or concerns please call the nurse team at [phone] or send a [employer] message.     Scheduling phone number: [phone]    It was a pleasure seeing you today!     Cookie Casey PA-C   [facility]

## 2025-04-29 DIAGNOSIS — R07.9 CHEST PAIN, UNSPECIFIED TYPE: ICD-10-CM

## 2025-04-29 RX ORDER — METOPROLOL SUCCINATE 25 MG/1
50 TABLET, EXTENDED RELEASE ORAL DAILY
Qty: 180 TABLET | Refills: 4 | Status: SHIPPED | OUTPATIENT
Start: 2025-04-29

## 2025-05-01 ENCOUNTER — HOSPITAL ENCOUNTER (OUTPATIENT)
Dept: CARDIOLOGY | Facility: CLINIC | Age: 70
Discharge: HOME | End: 2025-05-01
Attending: PHYSICIAN ASSISTANT
Payer: COMMERCIAL

## 2025-05-01 DIAGNOSIS — R93.1 ELEVATED CORONARY ARTERY CALCIUM SCORE: ICD-10-CM

## 2025-05-01 DIAGNOSIS — R07.9 EXERTIONAL CHEST PAIN: ICD-10-CM

## 2025-05-01 PROCEDURE — 93325 DOPPLER ECHO COLOR FLOW MAPG: CPT | Mod: TC

## 2025-05-20 LAB — CV ZIO PRELIM RESULTS: NORMAL

## 2025-05-24 ENCOUNTER — RESULTS FOLLOW-UP (OUTPATIENT)
Dept: CARDIOLOGY | Facility: CLINIC | Age: 70
End: 2025-05-24

## 2025-06-03 NOTE — PROGRESS NOTES
Cardiology Clinic Progress Note  Clare Wallace MRN# [id number]   YOB: 1955 Age: 69 year old         Primary Cardiologist: Dr. Amaral       Assessment:     1.  Exertional chest pain / elevated CAC score  -Echocardiogram with LVEF 60 to 65%, no RWMA, trace valvular disease, no pericardial effusion  - Cardiac CTA with total Agaston score of 52.7, 70th percentile, mild to moderate mid LAD stenosis  - stress echo 5/2025 with no ischemia  - suspect chest pain is likely non cardiac in etiology , she will further discuss with PCP   - continue ASA 81 mg daily, rosuvastatin 20 mg daily    2. HTN- well controlled on HCTZ 12.5 mg daily, Toprol 25 mg daily     3. HLD- elevated Lipoprotein A 170.  on Crestor 20 mg daily. LDL now 73. Heart healthy mediterranean diet discussed.     4. BMI 32     5. Heart palpitations -  Zio monitor 5/2025 with no significant arrhythmias or ectopy         Plan:   - continue current cardiac medications   - follow up routinely with PCP for regular health maintenance, blood pressure and cholesterol management   - follow up in cardiology clinic in 1 year with fasting lipid panel prior or sooner if needed       [facility]        HPI:   Clare Wallace is a very pleasant 69 year old female with PMH significant for HTN, HLD with elevated lipoprotein A, BMI 33 recently evaluated in cardiology clinic by Dr. Amaral 3/2025 for chest pain. EKG with NSR and no significant ST-T changes. Echocardiogram 3/2025 demonstrated LVEF 60-65% with no RWMA, normal RV, trace valvular disease and no pericardial effusion. Cardiac CT demonstrated total agatston score 52.7, placing the patient in the 70th percentile, Mild to moderate mid left anterior descending artery stenosis, no significant disease noted in the remainder of the coronary vasculature.     She was in the ER 3/7/35 with recurrent exertional chest pain and ruled out for MI. She was started on Imdur 30 mg  "daily.     At last visit she was not taking the Imdur as it gave her a headache. She was still having some exertional chest pain and she was set up for stress echo 5/2025 which was negative for ischemia.     Today, patient reports she has been walking 1-2 miles a day. She will get chest discomfort at higher levels of exertion such as walking up an incline. No rest pain. No SOB.  She did not have any chest pain during her stress echocardiogram. 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 She is also teaching at [employer].         ROS:   Please see pertinent positives in HPI      Medications:   Current Outpatient Medications   Medication Sig Dispense Refill    aspirin 81 MG EC tablet Take 81 mg by mouth daily.      hydroCHLOROthiazide 12.5 MG tablet TAKE 1 TABLET BY MOUTH EVERY DAY 90 tablet 2    isosorbide mononitrate (IMDUR) 30 MG 24 hr tablet Take 1 tablet (30 mg) by mouth daily. 14 tablet 0    levothyroxine (SYNTHROID/LEVOTHROID) 125 MCG tablet TAKE 1 TABLET BY MOUTH EVERY DAY 90 tablet 2    metoprolol succinate ER (TOPROL XL) 25 MG 24 hr tablet Take 1 tablets (25 mg) by mouth daily. 30 tablet 3    nitroGLYcerin (NITROSTAT) 0.4 MG sublingual tablet For chest pain place 1 tablet under the tongue every 5 minutes for 3 doses. If symptoms persist 5 minutes after 1st dose call 911. 30 tablet 3    Omega 3-6-9 Fatty Acids (OMEGA 3-6-9 PO)       rosuvastatin (CRESTOR) 20 MG tablet Take 1 tablet (20 mg) by mouth daily. 90 tablet 3    valACYclovir (VALTREX) 1000 mg tablet Take 1 tablet (1,000 mg) by mouth daily. 30 tablet 11         Physical Exam:     Vitals: /74   Pulse 57   Ht 1.702 m (5' 7\")   Wt 94.8 kg (209 lb)   BMI 32.73 kg/m        Wt Readings from Last 4 Encounters:   03/03/25 94.6 kg (208 lb 8 oz)   11/08/24 92.2 kg (203 lb 3.2 oz)   11/03/23 100.1 kg (220 lb 9.6 oz)   10/13/22 102 kg (224 lb 12.8 oz)       GEN: NAD  C/V:  Regular rate and rhythm, no murmur.  "   RESP: Respirations are unlabored. Clear to auscultation bilaterally without wheezing, rales, or rhonchi.  EXT: No LE edema.      Data:     Last lab work personally reviewed     Echo: 3/2025  1. The left ventricle is normal in size. There is normal left ventricular wall  thickness. Left ventricular systolic function is normal. The visual ejection  fraction is 60-65%. Diastolic Doppler findings (E/E' ratio and/or other  parameters) suggest left ventricular filling pressures are normal. No regional  wall motion abnormalities noted.  2. The right ventricle is normal size. The right ventricular systolic function  is normal.  3. Trace mitral and tricuspid regurgitation.  4. No pericardial effusion.  5. No previous study for comparison.    Cardiac CT 3/2025  1. Total Agatston score 52.7, placing the patient in the 70th  percentile when compared to age and gender matched control group.  2. Mild to moderate mid left anterior descending artery stenosis, no  significant disease noted in the remainder of the coronary  vasculature.     Stress Echo 5/2025  A treadmill exercise test according to the Jann protocol was performed.  The patient exercised 9:30.  The patient exhibited no chest pain during exercise.  The EKG portion of this stress test was negative for inducible ischemia (see  echo results below).  Normal resting wall motion and no stress-induced wall motion abnormality.  This was a normal stress echocardiogram with no evidence of stress-induced  ischemia.    Zio 5/2025  13 day Ziopatch monitor  Baseline sinus rhythm with heart rates ranging , average 65 bpm.  No sustained arrhythmias.  Patient triggered events correlated with sinus rhythm with heart rates ranging 90 to 100 bpm.

## 2025-06-04 ENCOUNTER — OFFICE VISIT (OUTPATIENT)
Dept: CARDIOLOGY | Facility: CLINIC | Age: 70
End: 2025-06-04
Attending: PHYSICIAN ASSISTANT
Payer: COMMERCIAL

## 2025-06-04 VITALS
WEIGHT: 209 LBS | HEIGHT: 67 IN | BODY MASS INDEX: 32.8 KG/M2 | DIASTOLIC BLOOD PRESSURE: 74 MMHG | HEART RATE: 57 BPM | SYSTOLIC BLOOD PRESSURE: 118 MMHG

## 2025-06-04 DIAGNOSIS — R07.9 EXERTIONAL CHEST PAIN: ICD-10-CM

## 2025-06-04 DIAGNOSIS — E78.2 MIXED HYPERLIPIDEMIA: Primary | ICD-10-CM

## 2025-06-04 DIAGNOSIS — R00.2 PALPITATIONS: ICD-10-CM

## 2025-06-04 DIAGNOSIS — R93.1 ELEVATED CORONARY ARTERY CALCIUM SCORE: ICD-10-CM

## 2025-06-04 NOTE — LETTER
6/4/2025    Gi Chavez MD  [address] [facility] [address]    RE: Clare Wallace       Dear Colleague,     I had the pleasure of seeing Clare Wallace in the [facility].  Cardiology Clinic Progress Note  Clare Wallace MRN# [id number]   YOB: 1955 Age: 69 year old         Primary Cardiologist: Dr. Amaral       Assessment:     1.  Exertional chest pain / elevated CAC score  -Echocardiogram with LVEF 60 to 65%, no RWMA, trace valvular disease, no pericardial effusion  - Cardiac CTA with total Agaston score of 52.7, 70th percentile, mild to moderate mid LAD stenosis  - stress echo 5/2025 with no ischemia  - suspect chest pain is likely non cardiac in etiology , she will further discuss with PCP   - continue ASA 81 mg daily, rosuvastatin 20 mg daily    2. HTN- well controlled on HCTZ 12.5 mg daily, Toprol 25 mg daily     3. HLD- elevated Lipoprotein A 170.  on Crestor 20 mg daily. LDL now 73. Heart healthy mediterranean diet discussed.     4. BMI 32     5. Heart palpitations -  Zio monitor 5/2025 with no significant arrhythmias or ectopy         Plan:   - continue current cardiac medications   - follow up routinely with PCP for regular health maintenance, blood pressure and cholesterol management   - follow up in cardiology clinic in 1 year with fasting lipid panel prior or sooner if needed       [facility]        HPI:   Clare Wallace is a very pleasant 69 year old female with PMH significant for HTN, HLD with elevated lipoprotein A, BMI 33 recently evaluated in cardiology clinic by Dr. Amaral 3/2025 for chest pain. EKG with NSR and no significant ST-T changes. Echocardiogram 3/2025 demonstrated LVEF 60-65% with no RWMA, normal RV, trace valvular disease and no pericardial effusion. Cardiac CT demonstrated total agatston score 52.7, placing the patient in the 70th percentile, Mild to moderate mid left anterior descending  "artery stenosis, no significant disease noted in the remainder of the coronary vasculature.     She was in the ER 3/7/35 with recurrent exertional chest pain and ruled out for MI. She was started on Imdur 30 mg daily.     At last visit she was not taking the Imdur as it gave her a headache. She was still having some exertional chest pain and she was set up for stress echo 5/2025 which was negative for ischemia.     Today, patient reports she has been walking 1-2 miles a day. She will get chest discomfort at higher levels of exertion such as walking up an incline. No rest pain. No SOB.  She did not have any chest pain during her stress echocardiogram. 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         ROS:   Please see pertinent positives in HPI      Medications:   Current Outpatient Medications   Medication Sig Dispense Refill     aspirin 81 MG EC tablet Take 81 mg by mouth daily.       hydroCHLOROthiazide 12.5 MG tablet TAKE 1 TABLET BY MOUTH EVERY DAY 90 tablet 2     isosorbide mononitrate (IMDUR) 30 MG 24 hr tablet Take 1 tablet (30 mg) by mouth daily. 14 tablet 0     levothyroxine (SYNTHROID/LEVOTHROID) 125 MCG tablet TAKE 1 TABLET BY MOUTH EVERY DAY 90 tablet 2     metoprolol succinate ER (TOPROL XL) 25 MG 24 hr tablet Take 1 tablets (25 mg) by mouth daily. 30 tablet 3     nitroGLYcerin (NITROSTAT) 0.4 MG sublingual tablet For chest pain place 1 tablet under the tongue every 5 minutes for 3 doses. If symptoms persist 5 minutes after 1st dose call 911. 30 tablet 3     Omega 3-6-9 Fatty Acids (OMEGA 3-6-9 PO)        rosuvastatin (CRESTOR) 20 MG tablet Take 1 tablet (20 mg) by mouth daily. 90 tablet 3     valACYclovir (VALTREX) 1000 mg tablet Take 1 tablet (1,000 mg) by mouth daily. 30 tablet 11         Physical Exam:     Vitals: /74   Pulse 57   Ht 1.702 m (5' 7\")   Wt 94.8 kg (209 lb)   BMI 32.73 kg/m        Wt Readings " from Last 4 Encounters:   03/03/25 94.6 kg (208 lb 8 oz)   11/08/24 92.2 kg (203 lb 3.2 oz)   11/03/23 100.1 kg (220 lb 9.6 oz)   10/13/22 102 kg (224 lb 12.8 oz)       GEN: NAD  C/V:  Regular rate and rhythm, no murmur.    RESP: Respirations are unlabored. Clear to auscultation bilaterally without wheezing, rales, or rhonchi.  EXT: No LE edema.      Data:     Last lab work personally reviewed     Echo: 3/2025  1. The left ventricle is normal in size. There is normal left ventricular wall  thickness. Left ventricular systolic function is normal. The visual ejection  fraction is 60-65%. Diastolic Doppler findings (E/E' ratio and/or other  parameters) suggest left ventricular filling pressures are normal. No regional  wall motion abnormalities noted.  2. The right ventricle is normal size. The right ventricular systolic function  is normal.  3. Trace mitral and tricuspid regurgitation.  4. No pericardial effusion.  5. No previous study for comparison.    Cardiac CT 3/2025  1. Total Agatston score 52.7, placing the patient in the 70th  percentile when compared to age and gender matched control group.  2. Mild to moderate mid left anterior descending artery stenosis, no  significant disease noted in the remainder of the coronary  vasculature.     Stress Echo 5/2025  A treadmill exercise test according to the Jann protocol was performed.  The patient exercised 9:30.  The patient exhibited no chest pain during exercise.  The EKG portion of this stress test was negative for inducible ischemia (see  echo results below).  Normal resting wall motion and no stress-induced wall motion abnormality.  This was a normal stress echocardiogram with no evidence of stress-induced  ischemia.    Renano 5/2025  13 day Ziopatch monitor  Baseline sinus rhythm with heart rates ranging , average 65 bpm.  No sustained arrhythmias.  Patient triggered events correlated with sinus rhythm with heart rates ranging 90 to 100 bpm.        Thank  you for allowing me to participate in the care of your patient.      Sincerely,     Cookie Casey PA-C     [facility]  cc:   Cookie Casey PA-C  [address] [place] [id number]

## 2025-06-07 ENCOUNTER — MYC MEDICAL ADVICE (OUTPATIENT)
Dept: FAMILY MEDICINE | Facility: CLINIC | Age: 70
End: 2025-06-07
Payer: COMMERCIAL

## 2025-06-09 ENCOUNTER — E-VISIT (OUTPATIENT)
Dept: URGENT CARE | Facility: CLINIC | Age: 70
End: 2025-06-09
Payer: COMMERCIAL

## 2025-06-09 DIAGNOSIS — S30.861A TICK BITE OF ABDOMINAL WALL, INITIAL ENCOUNTER: Primary | ICD-10-CM
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
## 2025-06-09 NOTE — PATIENT INSTRUCTIONS
"Dear Clare Wallace       A tick must be attached for at least 24-48 hours to transfer pathogens and cause an infection. Infection is very unlikely in the first 48-72 hours.  Infection is more likely if the tick is engorged or has been attached for over 72 hours.  Prophylactic antibiotics are recommended only if:  The tick was attached for 36 hours or more AND less than 72 hours has passed since the tick was removed    If there is a contraindication to doxycycline, an alternate antibiotic is not recommended.  Guidelines state, \"If sections of the mouthparts of the tick remain in the skin, they should be left alone as they will normally be expelled spontaneously.\"    Ticks should be removed with tweezers or protected fingers pulling straight out gently and firmly using steady pressure.   Apply ice packs, may take benadryl as needed itch/irritation.  Ibuprofen for discomfort.     Watch for signs of secondary infection- redness, swelling, pain, colored discharge or fever.  Advised to watch for erythema migrans rash (circular red ringed rash), fever, chills, sweats, body aches, stiff neck, headache, joint pain/ discomfort/ sweling or other flu/illness symptoms and be seen by primary care provider at that time.   Erythema migrans rash appears several days after tick bite and is separate from a local reaction to a tick bite.  Please follow up with primary care provider if not improving, worsening or new symptoms.  It will take 6-8 weeks before antibodies are detected with the lyme screen titer.     Thanks for choosing us as your health care partner,    Macey Land, CNP  "

## 2025-06-09 NOTE — TELEPHONE ENCOUNTER
See my chart - Evisit recommended     Amelia Galdamez, Registered Nurse  [facility]

## 2025-06-11 ENCOUNTER — MYC REFILL (OUTPATIENT)
Dept: CARDIOLOGY | Facility: CLINIC | Age: 70
End: 2025-06-11
Payer: COMMERCIAL

## 2025-06-11 DIAGNOSIS — R07.9 CHEST PAIN, UNSPECIFIED TYPE: ICD-10-CM

## 2025-06-11 RX ORDER — ROSUVASTATIN CALCIUM 20 MG/1
20 TABLET, COATED ORAL DAILY
Qty: 90 TABLET | Refills: 3 | Status: SHIPPED | OUTPATIENT
Start: 2025-06-11

## 2025-07-09 ENCOUNTER — OFFICE VISIT (OUTPATIENT)
Dept: FAMILY MEDICINE | Facility: CLINIC | Age: 70
End: 2025-07-09
Payer: COMMERCIAL

## 2025-07-09 VITALS
RESPIRATION RATE: 18 BRPM | DIASTOLIC BLOOD PRESSURE: 78 MMHG | TEMPERATURE: 97.5 F | BODY MASS INDEX: 32.96 KG/M2 | HEIGHT: 67 IN | WEIGHT: 210 LBS | SYSTOLIC BLOOD PRESSURE: 125 MMHG | HEART RATE: 53 BPM | OXYGEN SATURATION: 100 %

## 2025-07-09 DIAGNOSIS — K44.9 HIATAL HERNIA: ICD-10-CM

## 2025-07-09 DIAGNOSIS — Z78.0 POST-MENOPAUSAL: ICD-10-CM

## 2025-07-09 DIAGNOSIS — Z12.11 COLON CANCER SCREENING: ICD-10-CM

## 2025-07-09 DIAGNOSIS — R07.9 EXERTIONAL CHEST PAIN: Primary | ICD-10-CM

## 2025-07-09 PROCEDURE — G2211 COMPLEX E/M VISIT ADD ON: HCPCS | Performed by: FAMILY MEDICINE

## 2025-07-09 PROCEDURE — 99214 OFFICE O/P EST MOD 30 MIN: CPT | Performed by: FAMILY MEDICINE

## 2025-07-09 RX ORDER — PANTOPRAZOLE SODIUM 40 MG/1
40 TABLET, DELAYED RELEASE ORAL DAILY
Qty: 30 TABLET | Refills: 1 | Status: SHIPPED | OUTPATIENT
Start: 2025-07-09

## 2025-07-09 NOTE — PROGRESS NOTES
"  Assessment & Plan     Exertional chest pain  Recommend that we get a dedicated chest CT to evaluate for abnormalities, as the CT Calcium score is reported as limited.  Discussed with patient that cause could be multiple possible abnormalities such as GERD, Asthma/COPD/Airway disease, Musculoskeletal, cancerous process, rheumatologic, etc. Given that she has pressure at rest and woke up in the night coupled with her hiatal hernia I suspect GERD may be the cause of her pain.  We discussed undergoing upper GI when she gets her colonoscopy done to assess for gastritis, PUD, etc.  We also discussed that if these conditions were found treatment would be PPI use, and that it would be reasonable to skip imaging and trial PPI.  She would like to try to get the upper GI done so we have a more definitive diagnosis, and will hold off on doing PPI.  However, we will send in Pantoprazole for her so that if her pain becomes too bothersome she can cancel the upper GI and just start treatment for 30-60 days.  If she starts Pantoprazole I recommended she cancel the upper endoscopy, as this could alter the findings.  She was understanding and in agreement with plan.  She has follow up in November, will touch base at that time or sooner as needed.  Additional recommendations pending results of chest CT.   - CT Chest w/o Contrast; Future  - pantoprazole (PROTONIX) 40 MG EC tablet; Take 1 tablet (40 mg) by mouth daily.    Hiatal hernia  As above  - Adult GI  Referral - Procedure Only; Future    Colon cancer screening  - Colonoscopy Screening  Referral; Future    Post-menopausal  - DX Bone Density; Future    BMI  Estimated body mass index is 32.89 kg/m  as calculated from the following:    Height as of this encounter: 1.702 m (5' 7\").    Weight as of this encounter: 95.3 kg (210 lb).     The longitudinal plan of care for the diagnosis(es)/condition(s) as documented were addressed during this visit. Due to the added " "complexity in care, I will continue to support Clare in the subsequent management and with ongoing continuity of care.      36 minutes spent by me on the date of the encounter doing chart review, history and exam, documentation and further activities per the note    Subjective   Clare is a 69 year old, presenting for the following health issues:  Chest Pain        7/9/2025     3:50 PM   Additional Questions   Roomed by Rika ARMSTRONG     History of Present Illness       Reason for visit:  Non-CORONARY chest pain  Symptom onset:  More than a month   She is taking medications regularly.      In Appt notes: \"I experience chest pain when I exert myself w/exercise and have been through all tests with cardiology. While I have minimal blockage in one artery, no tests indicated a cardio reason for this pain. Cardiology recommended I meet with you to discuss alternative reasons for chest pain when I exert myself exercising or walking. I am now taking statens, which have brought my cholesterol levels to normal.\"    Cardiology 6-4-25: Exertional chest pain with elevated calcium score: Echo with EF 60-65%, No RWMA, trace valvular disease.  Calcium CT mild to moderate LAD stenosis.  Stress Echo with no ischemia.  Non-cardiac etiology, continue ASA and Statin.  Well controlled HTN.  Previous EKG on 3-25-25 NSR, no ST changes.  No significant extracardiac findings.    One night she woke up from bad chest pain.  She can feel pressure in the middle of her chest during our visit today.  She wonders if it is the hiatal hernia.    Current Outpatient Medications   Medication Sig Dispense Refill    aspirin 81 MG EC tablet Take 81 mg by mouth daily.      hydroCHLOROthiazide 12.5 MG tablet TAKE 1 TABLET BY MOUTH EVERY DAY 90 tablet 2    levothyroxine (SYNTHROID/LEVOTHROID) 125 MCG tablet TAKE 1 TABLET BY MOUTH EVERY DAY 90 tablet 2    metoprolol succinate ER (TOPROL XL) 25 MG 24 hr tablet Take 2 tablets (50 mg) by mouth daily. (Patient taking " "differently: Take 25 mg by mouth daily.) 180 tablet 4    nitroGLYcerin (NITROSTAT) 0.4 MG sublingual tablet For chest pain place 1 tablet under the tongue every 5 minutes for 3 doses. If symptoms persist 5 minutes after 1st dose call 911. 30 tablet 3    Omega 3-6-9 Fatty Acids (OMEGA 3-6-9 PO)       pantoprazole (PROTONIX) 40 MG EC tablet Take 1 tablet (40 mg) by mouth daily. 30 tablet 1    rosuvastatin (CRESTOR) 20 MG tablet Take 1 tablet (20 mg) by mouth daily. 90 tablet 3    valACYclovir (VALTREX) 1000 mg tablet Take 1 tablet (1,000 mg) by mouth daily. 30 tablet 11             Objective    /78 (BP Location: Left arm, Patient Position: Sitting, Cuff Size: Adult Large)   Pulse 53   Temp 97.5  F (36.4  C) (Oral)   Resp 18   Ht 1.702 m (5' 7\")   Wt 95.3 kg (210 lb)   SpO2 100%   BMI 32.89 kg/m    Body mass index is 32.89 kg/m .  Physical Exam   GENERAL: alert and no distress  PSYCH: mentation appears normal, affect normal/bright    Labs and imaging reviewed in chart        Signed Electronically by: Gi Chavez MD    "

## 2025-07-10 ENCOUNTER — PATIENT OUTREACH (OUTPATIENT)
Dept: CARE COORDINATION | Facility: CLINIC | Age: 70
End: 2025-07-10
Payer: COMMERCIAL

## 2025-07-15 ENCOUNTER — TELEPHONE (OUTPATIENT)
Dept: GASTROENTEROLOGY | Facility: CLINIC | Age: 70
End: 2025-07-15
Payer: COMMERCIAL

## 2025-07-15 NOTE — TELEPHONE ENCOUNTER
"Endoscopy Scheduling Screen    Caller: patient    Have you had any respiratory illness or flu-like symptoms in the last 10 days?  No    Patient is ACTIVE on [employer].  Inform patient that all appointment instructions will be sent via [employer].    Review patient's insurance for any non participating payor.    Ordering/Referring Provider:     [facility]EMI JO      (If ordering provider performs procedure, schedule with ordering provider unless otherwise instructed. )    BMI: Estimated body mass index is 32.89 kg/m  as calculated from the following:    Height as of 7/9/25: 1.702 m (5' 7\").    Weight as of 7/9/25: 95.3 kg (210 lb).     Sedation Ordered  moderate sedation.   If patient BMI > 50 do not schedule in [facility].    If patient BMI > 45 do not schedule at [facility].    Are you taking methadone or Suboxone?  NO, No RN review required.    Have you been diagnosed and are being treated for severe PTSD or severe anxiety?  NO, No RN review required.    Are you taking any prescription medications for pain 3 or more times per week?   NO, No RN review required.    Do you have a history of malignant hyperthermia?  No    (Females) Are you currently pregnant?        Have you been diagnosed or told you have pulmonary hypertension?   No    Do you have an LVAD?  No    Have you been told you have moderate to severe sleep apnea?  No.    Have you been told you have COPD, asthma, or any other lung disease?  No    Has your doctor ordered any cardiac tests like echo, angiogram, stress test, ablation, or EKG, that you have not completed yet?  No    Do you  have a history of any heart conditions?  Yes     Have you had any hospitalizations  in the last year for heart related issues, for example a stent placement, heart attack, or cardiomyopathy?  No    Do you have any implantable devices in your body (pacemaker, ICD)?  No    Do you take nitroglycerine?  No    Have you ever had or are you waiting for an organ transplant?  No. Continue " "scheduling, no site restrictions.    Have you had a stroke or transient ischemic attack (TIA aka \"mini stroke\") in the last 2 years?   No.    Have you been diagnosed with or been told you have cirrhosis of the liver?   No.    Are you currently on dialysis?   No    Do you need assistance transferring?   No    BMI: Estimated body mass index is 32.89 kg/m  as calculated from the following:    Height as of 7/9/25: 1.702 m (5' 7\").    Weight as of 7/9/25: 95.3 kg (210 lb).     Is patients BMI > 40 and scheduling location [facility]?  No    Do you take an injectable or oral medication for weight loss or diabetes (excluding insulin)?  No    Do you take the medication Naltrexone?  No    Do you take blood thinners?  No       Prep   Are you currently on dialysis or do you have chronic kidney disease?  No    Do you have a diagnosis of diabetes?  No    Do you have a diagnosis of cystic fibrosis (CF)?  No    On a regular basis do you go 3 -5 days between bowel movements?  No    BMI > 40?  No    Preferred Pharmacy:    [id number] [place], [place] - [address] [facility]  [address] [facility] [address]  Phone: [phone] Fax: [phone]    Final Scheduling Details     Procedure scheduled  Colonoscopy    Surgeon:  LICO     Date of procedure:  9/4     Pre-OP / PAC:   No - Not required for this site.    Location  [facility] - Per order.    Sedation   Moderate Sedation - Per order.      Patient Reminders:   You will receive a call from a Nurse to review instructions and health history.  This assessment must be completed prior to your procedure.  Failure to complete the Nurse assessment may result in the procedure being cancelled.      On the day of your procedure, please designate an adult(s) who can drive you home stay with you for the next 24 hours. The medicines used in the exam will make you sleepy. You will not be able to drive.      You cannot take public transportation, ride share services, or non-medical taxi service " without a responsible caregiver.  Medical transport services are allowed with the requirement that a responsible caregiver will receive you at your destination.  We require that drivers and caregivers are confirmed prior to your procedure.

## 2025-07-23 ENCOUNTER — RESULTS FOLLOW-UP (OUTPATIENT)
Dept: FAMILY MEDICINE | Facility: CLINIC | Age: 70
End: 2025-07-23
Payer: COMMERCIAL

## 2025-07-23 ENCOUNTER — HOSPITAL ENCOUNTER (OUTPATIENT)
Dept: CT IMAGING | Facility: CLINIC | Age: 70
Discharge: HOME | End: 2025-07-23
Attending: FAMILY MEDICINE
Payer: COMMERCIAL

## 2025-07-23 DIAGNOSIS — R07.9 EXERTIONAL CHEST PAIN: ICD-10-CM

## 2025-07-23 DIAGNOSIS — R93.89 ABNORMAL IMAGING OF THYROID: Primary | ICD-10-CM

## 2025-07-23 PROCEDURE — 71250 CT THORAX DX C-: CPT

## 2025-07-25 ENCOUNTER — HOSPITAL ENCOUNTER (OUTPATIENT)
Dept: ULTRASOUND IMAGING | Facility: CLINIC | Age: 70
Discharge: HOME | End: 2025-07-25
Attending: FAMILY MEDICINE | Admitting: FAMILY MEDICINE
Payer: COMMERCIAL

## 2025-07-25 DIAGNOSIS — R93.89 ABNORMAL IMAGING OF THYROID: ICD-10-CM

## 2025-07-25 PROCEDURE — 76536 US EXAM OF HEAD AND NECK: CPT

## 2025-08-13 ENCOUNTER — TELEPHONE (OUTPATIENT)
Dept: GASTROENTEROLOGY | Facility: CLINIC | Age: 70
End: 2025-08-13
Payer: COMMERCIAL

## 2025-08-18 ENCOUNTER — HOSPITAL ENCOUNTER (OUTPATIENT)
Dept: ULTRASOUND IMAGING | Facility: CLINIC | Age: 70
Discharge: HOME | End: 2025-08-18
Attending: FAMILY MEDICINE | Admitting: FAMILY MEDICINE
Payer: COMMERCIAL

## 2025-08-18 DIAGNOSIS — E04.1 THYROID NODULE: ICD-10-CM

## 2025-08-18 DIAGNOSIS — R93.89 ABNORMAL IMAGING OF THYROID: ICD-10-CM

## 2025-08-18 PROCEDURE — 88173 CYTOPATH EVAL FNA REPORT: CPT | Mod: TC | Performed by: FAMILY MEDICINE

## 2025-08-18 PROCEDURE — 96372 THER/PROPH/DIAG INJ SC/IM: CPT | Performed by: STUDENT IN AN ORGANIZED HEALTH CARE EDUCATION/TRAINING PROGRAM

## 2025-08-18 PROCEDURE — 88173 CYTOPATH EVAL FNA REPORT: CPT | Mod: 26 | Performed by: PATHOLOGY

## 2025-08-18 PROCEDURE — 250N000009 HC RX 250: Performed by: STUDENT IN AN ORGANIZED HEALTH CARE EDUCATION/TRAINING PROGRAM

## 2025-08-18 PROCEDURE — 272N000431 US BIOPSY THYROID FINE NEEDLE ASPIRATION

## 2025-08-18 RX ORDER — LIDOCAINE HYDROCHLORIDE 10 MG/ML
5 INJECTION, SOLUTION EPIDURAL; INFILTRATION; INTRACAUDAL; PERINEURAL ONCE
Status: COMPLETED | OUTPATIENT
Start: 2025-08-18 | End: 2025-08-18

## 2025-08-18 RX ADMIN — LIDOCAINE HYDROCHLORIDE 5 ML: 10 INJECTION, SOLUTION EPIDURAL; INFILTRATION; INTRACAUDAL; PERINEURAL at 09:52

## 2025-08-21 ENCOUNTER — LAB (OUTPATIENT)
Dept: LAB | Facility: CLINIC | Age: 70
End: 2025-08-21
Payer: COMMERCIAL

## 2025-08-21 DIAGNOSIS — R93.89 ABNORMAL IMAGING OF THYROID: ICD-10-CM

## 2025-08-21 DIAGNOSIS — E04.1 THYROID NODULE: ICD-10-CM

## 2025-08-21 LAB
PATH REPORT.COMMENTS IMP SPEC: NORMAL
PATH REPORT.COMMENTS IMP SPEC: NORMAL
PATH REPORT.FINAL DX SPEC: NORMAL
PATH REPORT.GROSS SPEC: NORMAL
PATH REPORT.MICROSCOPIC SPEC OTHER STN: NORMAL
PATH REPORT.RELEVANT HX SPEC: NORMAL

## 2025-08-30 DIAGNOSIS — R07.9 EXERTIONAL CHEST PAIN: ICD-10-CM

## 2025-09-03 RX ORDER — PANTOPRAZOLE SODIUM 40 MG/1
40 TABLET, DELAYED RELEASE ORAL DAILY
Qty: 90 TABLET | Refills: 1 | Status: SHIPPED | OUTPATIENT
Start: 2025-09-03

## (undated) DEVICE — KIT ENDO TURNOVER/PROCEDURE W/CLEAN A SCOPE LINERS 103888

## (undated) DEVICE — ENDO FORCEP ENDOJAW BIOPSY 2.8MMX230CM FB-220U

## (undated) RX ORDER — SIMETHICONE 40MG/0.6ML
SUSPENSION, DROPS(FINAL DOSAGE FORM)(ML) ORAL
Status: DISPENSED
Start: 2021-06-21

## (undated) RX ORDER — FENTANYL CITRATE 50 UG/ML
INJECTION, SOLUTION INTRAMUSCULAR; INTRAVENOUS
Status: DISPENSED
Start: 2021-06-21